# Patient Record
Sex: MALE | Race: BLACK OR AFRICAN AMERICAN | Employment: OTHER | URBAN - METROPOLITAN AREA
[De-identification: names, ages, dates, MRNs, and addresses within clinical notes are randomized per-mention and may not be internally consistent; named-entity substitution may affect disease eponyms.]

---

## 2017-01-17 ENCOUNTER — OFFICE VISIT (OUTPATIENT)
Dept: FAMILY MEDICINE CLINIC | Facility: CLINIC | Age: 47
End: 2017-01-17

## 2017-01-17 VITALS
RESPIRATION RATE: 18 BRPM | HEART RATE: 82 BPM | OXYGEN SATURATION: 97 % | TEMPERATURE: 96.3 F | WEIGHT: 225 LBS | BODY MASS INDEX: 28.88 KG/M2 | DIASTOLIC BLOOD PRESSURE: 92 MMHG | HEIGHT: 74 IN | SYSTOLIC BLOOD PRESSURE: 128 MMHG

## 2017-01-17 DIAGNOSIS — E55.9 VITAMIN D DEFICIENCY: ICD-10-CM

## 2017-01-17 DIAGNOSIS — T50.905A WEIGHT GAIN DUE TO MEDICATION: ICD-10-CM

## 2017-01-17 DIAGNOSIS — R53.82 CHRONIC FATIGUE: ICD-10-CM

## 2017-01-17 DIAGNOSIS — F32.89 OTHER DEPRESSION: ICD-10-CM

## 2017-01-17 DIAGNOSIS — R63.5 WEIGHT GAIN DUE TO MEDICATION: ICD-10-CM

## 2017-01-17 DIAGNOSIS — R80.9 PROTEINURIA: ICD-10-CM

## 2017-01-17 DIAGNOSIS — F98.8 ADD (ATTENTION DEFICIT DISORDER): Primary | ICD-10-CM

## 2017-01-17 DIAGNOSIS — E78.00 PURE HYPERCHOLESTEROLEMIA: ICD-10-CM

## 2017-01-17 RX ORDER — VENLAFAXINE HYDROCHLORIDE 37.5 MG/1
37.5 CAPSULE, EXTENDED RELEASE ORAL DAILY
Qty: 30 CAP | Refills: 1 | Status: SHIPPED | OUTPATIENT
Start: 2017-01-17 | End: 2017-03-16 | Stop reason: SDUPTHER

## 2017-01-17 NOTE — PROGRESS NOTES
History and Physical    Patient: Eva Snow MRN: [id number]  SSN: [id number]    YOB: 1970  Age: 40 y.o. Sex: male      Subjective:      Eva Snow is a 40 y.o. male who is presenting for adult ADD and depression. In terms of his add, he works both at home and at the office. He reports being easily distracted as he will start one project and jump to another without completing it. He reports a difficulty focusing on tasks at hand. He reports his mind will also wander when he is trying to focus on a task. He also reports being easily forgetful. He also states he gets bored easily. He states that sometimes it will seem that he is not listening when he is being spoken to. He admits he processes information more slowly. He states these symptoms occur both at home and at work. Symptoms have been present for more than 6 months. Patient has never been on anything for this before. Patient reports feeling fidgety at home and at work. He states that these symptoms are moderately to severely debiliating as it is becoming very difficult to get work done both at home and at work. In terms of his depression, he states has not heard anything regarding the referral. He continues to suffer from depression but denies si/hi. Patient has a h/o interstitial lung disease for which he takes Prednisone daily and albuterol PRN. Sees pulmonology. He denies cough, sob etc currently. He has a follow up on 2/24/17.     PMH:  Past Medical History   Diagnosis Date    Eczema     Eosinophilia     ILD (interstitial lung disease) ([address]     Interstitial lung disease ([address]     Pulmonary lesion     Sarcoidosis ([address]      Past Surgical History   Procedure Laterality Date    Hx heent       wisdom teeth        FamHx:  Family History   Problem Relation Age of Onset   [address] [facility] Arthritis-osteo Mother     Asthma Mother     No Known Problems Father    [address] [facility] Migraines Sister     No Known Problems Sister Socialhx:  Social History   Substance Use Topics    Smoking status: Current Every Day Smoker     Packs/day: 0.25     Years: 16.00     Types: Cigarettes     Start date: 3/6/2000    Smokeless tobacco: Never Used    Alcohol use 0.0 oz/week     0 Standard drinks or equivalent per week      Comment: once a month        Meds:  Prior to Admission medications    Medication Sig Start Date End Date Taking? Authorizing Provider   venlafaxine-SR [facility] 37.5 mg capsule Take 1 Cap by mouth daily. 1/17/17  Yes Jessica Pumphrey V, PA   triamcinolone acetonide (KENALOG) 0.1 % ointment Apply  to affected area two (2) times a day. use thin layer 1/3/17  Yes Jessica Pumphrey V, PA   albuterol (PROVENTIL HFA, VENTOLIN HFA, PROAIR HFA) 90 mcg/actuation inhaler Take 2 Puffs by inhalation every four (4) hours as needed for Wheezing. 12/20/16  Yes Jessica Pumphrey V, PA   predniSONE (DELTASONE) 20 mg tablet TAKE 1 TAB BY MOUTH DAILY. 8/25/16  Yes Historical Provider   dextroamphetamine-amphetamine (ADDERALL) 10 mg tablet Take 1 Tab by mouth two (2) times a day.  Max Daily Amount: 20 mg. 12/20/16   LIBERTY Webster        Allergies:  No Known Allergies    Review of Systems:  Items in bold are positive:  Constitutional: negative for fevers, chills and malaise  Eyes: negative for visual disturbance  Ears, Nose, Mouth, Throat, and Face: negative for nasal congestion  Respiratory: negative for cough or SOB  Cardiovascular: negative for chest pain, chest pressure/discomfort  Gastrointestinal: negative for nausea, vomiting, melena, BRBPR, diarrhea, constipation and abdominal pain  Genitourinary:negative for frequency, dysuria, hesitancy and decreased stream  Musculoskeletal:negative for myalgias and arthralgias  Neurological: negative for headaches, dizziness and paresthesia  Psych: easily distracted, difficulty concentrating,forgetful, Depression-uncontrolled; denies si/hi    Objective:     Vitals:    01/17/17 1125 01/17/17 1328   BP: (!) 122/97 (!) 128/92   Pulse: 82    Resp: 18    Temp: 96.3 °F (35.7 °C)    TempSrc: Oral    SpO2: 97%    Weight: 225 lb (102.1 kg)    Height: 6' 2\" (1.88 m)         Physical Exam:  GENERAL: alert, cooperative, no distress, appears stated age  [de-identified]: EYE: conjunctivae/corneas clear. PERRL, EOM's intact. LUNG: clear to auscultation bilaterally  HEART: regular rate and rhythm, S1, S2 normal, no murmur, click, rub or gallop  ABDOMEN: negative CVA ttp bilaterally  EXTREMITIES:  extremities normal, atraumatic, no cyanosis or edema  NEUROLOGIC: AOx3. Gait normal.  PSYCH: mood; neutral; affect: netural          Assessment and Plan:       ICD-10-CM ICD-9-CM    1. ADD (attention deficit disorder) F98.8 314.00    2. Other depression F32.89  venlafaxine-SR (EFFEXOR-XR) 37.5 mg capsule   3. Weight gain due to medication T50.901A 783.9 HEMOGLOBIN A1C WITH EAG    R63.5     4. Pure hypercholesterolemia T61.83 054.9 METABOLIC PANEL, COMPREHENSIVE      LIPID PANEL   5. Vitamin D deficiency E55.9 268.9 VITAMIN D, 25 HYDROXY   6. Chronic fatigue R53.82 780.79 CBC WITH AUTOMATED DIFF   7. Proteinuria R80.9 791.0 CANCELED: AMB POC URINALYSIS DIP STICK AUTO W/ MICRO         Medical Decision Making:  ADD- will try to go through another prior auth with updated information    Depression- -start Effexor-advised of potential side effects- referral to to psych still pending    *fasting labs in 3 months    Follow-up Disposition:  Return in about 6 weeks (around 2/28/2017) for routine care with me, for med check. Patient acknowledges understanding of instructions and acknowledges understanding to call back if current symptoms worsen or new symptoms arise. Patient acknowledges and agrees with plan.     Signed By: LIBERTY Hui     January 17, 2017

## 2017-01-17 NOTE — PATIENT INSTRUCTIONS
Venlafaxine (By mouth)   Venlafaxine (lko-uj-JGU-een)  Treats depression, generalized anxiety disorder, panic disorder, and social anxiety disorder. Brand Name(s):Effexor, Effexor XR   There may be other brand names for this medicine. When This Medicine Should Not Be Used: This medicine is not right for everyone. Do not use it if you had an allergic reaction to venlafaxine. How to Use This Medicine:   Long Acting Capsule, Tablet, Long Acting Tablet  · Take your medicine as directed. Your dose may need to be changed several times to find what works best for you. · It is best to take the extended-release form at the same time each day (either in the morning or evening). · It is best to take this medicine with food or milk. · Swallow the extended-release capsule whole. Do not crush, break, or chew it. Do not place the capsule in a liquid. · If you cannot swallow the extended-release capsule, you may open it and pour the medicine into a small amount of soft food such as pudding, yogurt, or applesauce. Stir this mixture well and swallow it without chewing. · This medicine should come with a Medication Guide. Ask your pharmacist for a copy if you do not have one. · Missed dose: Take a dose as soon as you remember. If it is almost time for your next dose, wait until then and take a regular dose. Do not take extra medicine to make up for a missed dose. · Store the medicine in a closed container at room temperature, away from heat, moisture, and direct light. Drugs and Foods to Avoid:   Ask your doctor or pharmacist before using any other medicine, including over-the-counter medicines, vitamins, and herbal products. · Do not use this medicine if you have used an MAO inhibitor (MAOI), such as methylene blue or linezolid, within the past 14 days. Do not take an MAO inhibitor for at least 7 days after you stop this medicine. · Some medicines can affect how venlafaxine works.  Tell your doctor if you are using the following:   ¨ Buspirone, cimetidine, fentanyl, ketoconazole, lithium, metoprolol, Yevgeniy's wort, tramadol, tryptophan supplements, or warfarin  ¨ A diuretic (water pill), a tricyclic antidepressant, a triptan medicine for migraine headaches, medicine to lose weight (such as phentermine), or an NSAID pain or arthritis medicine (such as aspirin, celecoxib, diclofenac, ibuprofen, naproxen)  · Tell your doctor if you use anything else that makes you sleepy. Some examples are allergy medicine, narcotic pain medicine, and alcohol. Warnings While Using This Medicine:   · Tell your doctor if you are pregnant or breastfeeding, or if you have kidney disease, liver disease, glaucoma, heart disease, high blood pressure, or thyroid problems. Tell your doctor if you have a history of seizures or heart attack. · For some children, teenagers, and young adults, this medicine may increase mental or emotional problems. This may lead to thoughts of suicide and violence. Talk with your doctor right away if you have any thoughts or behavior changes that concern you. Tell your doctor if you or anyone in your family has a history of bipolar disorder or suicide attempts. · This medicine may cause the following problems:   ¨ Serotonin syndrome (when taken with certain medicines)  ¨ Low sodium levels (more common in elderly patients and those who take diuretics or become dehydrated)  ¨ Increased cholesterol levels  ¨ Increased blood pressure  ¨ Bleeding problems (more likely when taken with aspirin, other NSAIDs, or warfarin)  ¨ Interstitial lung disease and eosinophilic pneumonia  · This medicine may make you dizzy or drowsy. Do not drive or do anything that could be dangerous until you know how this medicine affects you. · Do not stop using this medicine suddenly. Your doctor will need to slowly decrease your dose before you stop it completely.   · Your doctor will do lab tests at regular visits to check on the effects of this medicine. Keep all appointments. · Keep all medicine out of the reach of children. Never share your medicine with anyone. Possible Side Effects While Using This Medicine:   Call your doctor right away if you notice any of these side effects:  · Allergic reaction: Itching or hives, swelling in your face or hands, swelling or tingling in your mouth or throat, chest tightness, trouble breathing  · Anxiety, restlessness, fever, sweating, muscle spasms, nausea, vomiting, diarrhea, seeing or hearing things that are not there  · Blistering, peeling, red skin rash  · Chest pain, cough, trouble breathing  · Confusion, weakness, and muscle twitching  · Eye pain, vision changes, seeing halos around lights  · Fast or pounding heartbeat  · Feeling more excited or energetic than usual  · Headache, trouble concentrating, memory problems, unsteadiness  · Seizures  · Trouble sleeping, nervousness, unusual dreams  · Unusual behavior, thoughts of hurting yourself or others  · Unusual bleeding or bruising  If you notice these less serious side effects, talk with your doctor:   · Dry mouth  · Headache  · Mild nausea, constipation, vomiting, loss of appetite, weight loss  · Sexual problems  · Sleepiness or unusual drowsiness  If you notice other side effects that you think are caused by this medicine, tell your doctor. Call your doctor for medical advice about side effects. You may report side effects to FDA at 4-173-FDA-0835  © 2016 [address] is for End User's use only and may not be sold, redistributed or otherwise used for commercial purposes. The above information is an  only. It is not intended as medical advice for individual conditions or treatments. Talk to your doctor, nurse or pharmacist before following any medical regimen to see if it is safe and effective for you.

## 2017-01-17 NOTE — MR AVS SNAPSHOT
Visit Information Date & Time Provider Department Dept. Phone Encounter #  
 1/17/2017 11:15 AM Mia Puri 47 [phone] [id number] Follow-up Instructions Return in about 6 weeks (around 2/28/2017) for routine care with me, for med check. Your Appointments 2/23/2017  8:00 AM  
Nurse Visit with PFT [facility] Edson [facility] [employer] [facility]) Appt Note: 3 m f/u sarcoidosis with Full PFT and 6 min walk [id number] [facility] [address]  
  
    
 2/23/2017  9:00 AM  
Follow Up with MD Luis Armando W[facility]) Appt Note: 3 m f/u sarcoidosis with Full PFT and 6 min walk [id number] [facility] [address] Upcoming Health Maintenance Date Due DTaP/Tdap/Td series (1 - Tdap) 11/10/1992 Allergies as of 1/17/2017  Review Complete On: 1/17/2017 By: Dhaval Honeycutt No Known Allergies Current Immunizations  Reviewed on 9/20/2016 No immunizations on file. Not reviewed this visit You Were Diagnosed With   
  
 Codes Comments Proteinuria    -  Primary ICD-10-CM: R80.9 ICD-9-CM: 791.0 Weight gain due to medication     ICD-10-CM: T50.901A, R63.5 ICD-9-CM: 783.9 Pure hypercholesterolemia     ICD-10-CM: E78.00 ICD-9-CM: 272.0 Vitamin D deficiency     ICD-10-CM: E55.9 ICD-9-CM: 268.9 Chronic fatigue     ICD-10-CM: R53.82 
ICD-9-CM: 780.79 Other depression     ICD-10-CM: F32.89 Vitals BP Pulse Temp Resp Height(growth percentile) Weight(growth percentile) (!) 122/97 (BP 1 Location: Right arm, BP Patient Position: Sitting) 82 96.3 °F (35.7 °C) (Oral) 18 6' 2\" (1.88 m) 225 lb (102.1 kg) SpO2 BMI Smoking Status 97% 28.89 kg/m2 Current Every Day Smoker BMI and BSA Data Body Mass Index Body Surface Area  
 28.89 kg/m 2 2.31 m 2 Preferred Pharmacy Pharmacy Name Phone [id number] [place] [phone] Your Updated Medication List  
  
   
This list is accurate as of: 1/17/17 11:45 AM.  Always use your most recent med list.  
  
  
  
  
 albuterol 90 mcg/actuation inhaler Commonly known as:  PROVENTIL HFA, VENTOLIN HFA, PROAIR HFA Take 2 Puffs by inhalation every four (4) hours as needed for Wheezing. dextroamphetamine-amphetamine 10 mg tablet Commonly known as:  ADDERALL Take 1 Tab by mouth two (2) times a day. Max Daily Amount: 20 mg.  
  
 predniSONE 20 mg tablet Commonly known as:  DELTASONE  
TAKE 1 TAB BY MOUTH DAILY. triamcinolone acetonide 0.1 % ointment Commonly known as:  KENALOG Apply  to affected area two (2) times a day. use thin layer  
  
 venlafaxine-SR 37.5 mg capsule Commonly known as:  EFFEXOR-XR Take 1 Cap by mouth daily. Prescriptions Sent to Pharmacy Refills  
 venlafaxine-SR (EFFEXOR-XR) 37.5 mg capsule 1 Sig: Take 1 Cap by mouth daily. Class: Normal  
 Pharmacy: [address], [address]  #: [phone] Route: Oral  
  
We Performed the Following AMB POC URINALYSIS DIP STICK AUTO W/ MICRO [99956 CPT(R)] Follow-up Instructions Return in about 6 weeks (around 2/28/2017) for routine care with me, for med check. To-Do List   
 04/17/2017 Lab:  VITAMIN D, 25 HYDROXY   
  
 04/18/2017 Lab:  HEMOGLOBIN A1C WITH EAG   
  
 04/19/2017 Lab:  CBC WITH AUTOMATED DIFF   
  
 04/19/2017 Lab:  LIPID PANEL   
  
 04/19/2017 Lab:  METABOLIC PANEL, COMPREHENSIVE Patient Instructions Venlafaxine (By mouth) Venlafaxine (vjh-xr-WNL-een) Treats depression, generalized anxiety disorder, panic disorder, and social anxiety disorder. Brand Name(s):Effexor, Effexor XR There may be other brand names for this medicine. When This Medicine Should Not Be Used: This medicine is not right for everyone. Do not use it if you had an allergic reaction to venlafaxine. How to Use This Medicine:  
Long Acting Capsule, Tablet, Long Acting Tablet · Take your medicine as directed. Your dose may need to be changed several times to find what works best for you. · It is best to take the extended-release form at the same time each day (either in the morning or evening). · It is best to take this medicine with food or milk. · Swallow the extended-release capsule whole. Do not crush, break, or chew it. Do not place the capsule in a liquid. · If you cannot swallow the extended-release capsule, you may open it and pour the medicine into a small amount of soft food such as pudding, yogurt, or applesauce. Stir this mixture well and swallow it without chewing. · This medicine should come with a Medication Guide. Ask your pharmacist for a copy if you do not have one. · Missed dose: Take a dose as soon as you remember. If it is almost time for your next dose, wait until then and take a regular dose. Do not take extra medicine to make up for a missed dose. · Store the medicine in a closed container at room temperature, away from heat, moisture, and direct light. Drugs and Foods to Avoid: Ask your doctor or pharmacist before using any other medicine, including over-the-counter medicines, vitamins, and herbal products. · Do not use this medicine if you have used an MAO inhibitor (MAOI), such as methylene blue or linezolid, within the past 14 days. Do not take an MAO inhibitor for at least 7 days after you stop this medicine. · Some medicines can affect how venlafaxine works. Tell your doctor if you are using the following: ¨ Buspirone, cimetidine, fentanyl, ketoconazole, lithium, metoprolol, Yevgeniy's wort, tramadol, tryptophan supplements, or warfarin ¨ A diuretic (water pill), a tricyclic antidepressant, a triptan medicine for migraine headaches, medicine to lose weight (such as phentermine), or an NSAID pain or arthritis medicine (such as aspirin, celecoxib, diclofenac, ibuprofen, naproxen) · Tell your doctor if you use anything else that makes you sleepy. Some examples are allergy medicine, narcotic pain medicine, and alcohol. Warnings While Using This Medicine: · Tell your doctor if you are pregnant or breastfeeding, or if you have kidney disease, liver disease, glaucoma, heart disease, high blood pressure, or thyroid problems. Tell your doctor if you have a history of seizures or heart attack. · For some children, teenagers, and young adults, this medicine may increase mental or emotional problems. This may lead to thoughts of suicide and violence. Talk with your doctor right away if you have any thoughts or behavior changes that concern you. Tell your doctor if you or anyone in your family has a history of bipolar disorder or suicide attempts. · This medicine may cause the following problems:  
¨ Serotonin syndrome (when taken with certain medicines) ¨ Low sodium levels (more common in elderly patients and those who take diuretics or become dehydrated) ¨ Increased cholesterol levels ¨ Increased blood pressure ¨ Bleeding problems (more likely when taken with aspirin, other NSAIDs, or warfarin) ¨ Interstitial lung disease and eosinophilic pneumonia · This medicine may make you dizzy or drowsy. Do not drive or do anything that could be dangerous until you know how this medicine affects you. · Do not stop using this medicine suddenly. Your doctor will need to slowly decrease your dose before you stop it completely.  
· Your doctor will do lab tests at regular visits to check on the effects of this medicine. Keep all appointments. · Keep all medicine out of the reach of children. Never share your medicine with anyone. Possible Side Effects While Using This Medicine:  
Call your doctor right away if you notice any of these side effects: · Allergic reaction: Itching or hives, swelling in your face or hands, swelling or tingling in your mouth or throat, chest tightness, trouble breathing · Anxiety, restlessness, fever, sweating, muscle spasms, nausea, vomiting, diarrhea, seeing or hearing things that are not there · Blistering, peeling, red skin rash · Chest pain, cough, trouble breathing · Confusion, weakness, and muscle twitching · Eye pain, vision changes, seeing halos around lights · Fast or pounding heartbeat · Feeling more excited or energetic than usual 
· Headache, trouble concentrating, memory problems, unsteadiness · Seizures · Trouble sleeping, nervousness, unusual dreams · Unusual behavior, thoughts of hurting yourself or others · Unusual bleeding or bruising If you notice these less serious side effects, talk with your doctor: · Dry mouth 
· Headache · Mild nausea, constipation, vomiting, loss of appetite, weight loss · Sexual problems · Sleepiness or unusual drowsiness If you notice other side effects that you think are caused by this medicine, tell your doctor. Call your doctor for medical advice about side effects. You may report side effects to FDA at 4-954-FDA-4387 © 2016 [address] is for End User's use only and may not be sold, redistributed or otherwise used for commercial purposes. The above information is an  only. It is not intended as medical advice for individual conditions or treatments. Talk to your doctor, nurse or pharmacist before following any medical regimen to see if it is safe and effective for you. Introducing \[facility] [place] [facility]\"" & HEALTH SERVICES!    
 Jalyn Tom introduces [employer] patient portal. Now you can access parts of your medical record, email your doctor's office, and request medication refills online. 1. In your internet browser, go to [email or web address] 2. Click on the First Time User? Click Here link in the Sign In box. You will see the New Member Sign Up page. 3. Enter your [employer] Access Code exactly as it appears below. You will not need to use this code after youve completed the sign-up process. If you do not sign up before the expiration date, you must request a new code. · [employer] Access Code: [email or web address] Expires: 3/20/2017  5:02 PM 
 
4. Enter the last four digits of your Social Security Number (xxxx) and Date of Birth (mm/dd/yyyy) as indicated and click Submit. You will be taken to the next sign-up page. 5. Create a [employer] ID. This will be your [employer] login ID and cannot be changed, so think of one that is secure and easy to remember. 6. Create a [employer] password. You can change your password at any time. 7. Enter your Password Reset Question and Answer. This can be used at a later time if you forget your password. 8. Enter your e-mail address. You will receive e-mail notification when new information is available in [address] 9. Click Sign Up. You can now view and download portions of your medical record. 10. Click the Download Summary menu link to download a portable copy of your medical information. If you have questions, please visit the Frequently Asked Questions section of the [employer] website. Remember, [employer] is NOT to be used for urgent needs. For medical emergencies, dial 911. Now available from your iPhone and Android! Please provide this summary of care documentation to your next provider. Your primary care clinician is listed as Zoila Malave. If you have any questions after today's visit, please call [phone].

## 2017-01-17 NOTE — PROGRESS NOTES
Nickolas Chavis is a 40 y.o. male presents today for follow up. 1. Have you been to the ER, urgent care clinic since your last visit? Hospitalized since your last visit? No    2. Have you seen or consulted any other health care providers outside of the [address] [place] [address] since your last visit? Include any pap smears or colon screening.  No

## 2017-01-26 LAB
BILIRUB UR QL: NEGATIVE
CREATININE, URINE: 117 MG/DL
EPITHELIAL,EPSU: NORMAL /HPF (ref 0–2)
GLUCOSE UR QL: NEGATIVE MG/DL
HGB UR QL STRIP: NEGATIVE
KETONES UR QL STRIP.AUTO: NEGATIVE MG/DL
LEUKOCYTE ESTERASE: NEGATIVE
MICROALB/CREAT RATIO, 140286: NORMAL MCG/MG OF CREATININE (ref 0–30)
MICROALBUMIN,URINE RANDOM 140054: <12 UG/ML (ref 0.1–17)
NITRITE UR QL STRIP.AUTO: NEGATIVE
PH UR STRIP: 8 PH (ref 5–8)
PROT UR QL STRIP: NEGATIVE MG/DL
RBC #/AREA URNS HPF: NORMAL /HPF
SP GR UR: 1.02 (ref 1–1.03)
UROBILINOGEN UR STRIP-MCNC: <2 MG/DL
WBC URNS QL MICRO: NORMAL /HPF (ref 0–2)

## 2017-03-16 DIAGNOSIS — F32.89 OTHER DEPRESSION: ICD-10-CM

## 2017-03-16 RX ORDER — VENLAFAXINE HYDROCHLORIDE 37.5 MG/1
37.5 CAPSULE, EXTENDED RELEASE ORAL DAILY
Qty: 30 CAP | Refills: 3 | Status: SHIPPED | OUTPATIENT
Start: 2017-03-16 | End: 2017-04-11 | Stop reason: ALTCHOICE

## 2017-03-27 NOTE — TELEPHONE ENCOUNTER
I contacted the pt. And informed him that Dr. Adeline Stanford is no longer with [facility] in [place]. He was not aware of this. I explained that he has the option of coming over to [facility] until they can get a Pulmonologists in [place], or, he could check with his PCP re: a referral to a Pulmonologist in [place]. He states he need an appt. With his PCP and will ask them. I also pointed out if, he could, he needs a refill on his Prednisone and to check and see if, they would be willing to do it for him. We will help in any way that we can.

## 2017-04-11 ENCOUNTER — OFFICE VISIT (OUTPATIENT)
Dept: FAMILY MEDICINE CLINIC | Facility: CLINIC | Age: 47
End: 2017-04-11

## 2017-04-11 VITALS
DIASTOLIC BLOOD PRESSURE: 90 MMHG | HEIGHT: 74 IN | SYSTOLIC BLOOD PRESSURE: 124 MMHG | OXYGEN SATURATION: 96 % | TEMPERATURE: 97.8 F | BODY MASS INDEX: 28.36 KG/M2 | WEIGHT: 221 LBS | RESPIRATION RATE: 16 BRPM | HEART RATE: 93 BPM

## 2017-04-11 DIAGNOSIS — D72.10 EOSINOPHILIA: ICD-10-CM

## 2017-04-11 DIAGNOSIS — F90.9 ADULT ADHD: ICD-10-CM

## 2017-04-11 DIAGNOSIS — D86.0 PULMONARY SARCOIDOSIS (HCC): ICD-10-CM

## 2017-04-11 DIAGNOSIS — J84.9 ILD (INTERSTITIAL LUNG DISEASE) (HCC): ICD-10-CM

## 2017-04-11 DIAGNOSIS — F32.4 MAJOR DEPRESSIVE DISORDER WITH SINGLE EPISODE, IN PARTIAL REMISSION (HCC): Primary | ICD-10-CM

## 2017-04-11 PROBLEM — F32.9 MAJOR DEPRESSIVE DISORDER: Status: ACTIVE | Noted: 2017-04-11

## 2017-04-11 PROBLEM — F98.8 ADD (ATTENTION DEFICIT DISORDER): Status: ACTIVE | Noted: 2017-04-11

## 2017-04-11 RX ORDER — DEXTROAMPHETAMINE SACCHARATE, AMPHETAMINE ASPARTATE, DEXTROAMPHETAMINE SULFATE AND AMPHETAMINE SULFATE 2.5; 2.5; 2.5; 2.5 MG/1; MG/1; MG/1; MG/1
10 TABLET ORAL 2 TIMES DAILY
Qty: 60 TAB | Refills: 0 | Status: SHIPPED | OUTPATIENT
Start: 2017-04-11 | End: 2017-07-11 | Stop reason: SDUPTHER

## 2017-04-11 RX ORDER — ALBUTEROL SULFATE 90 UG/1
2 AEROSOL, METERED RESPIRATORY (INHALATION)
Qty: 1 INHALER | Refills: 6 | Status: SHIPPED | OUTPATIENT
Start: 2017-04-11 | End: 2017-07-11 | Stop reason: SDUPTHER

## 2017-04-11 RX ORDER — BUPROPION HYDROCHLORIDE 150 MG/1
150 TABLET ORAL
Qty: 30 TAB | Refills: 3 | Status: SHIPPED | OUTPATIENT
Start: 2017-04-11 | End: 2017-11-07

## 2017-04-11 NOTE — PATIENT INSTRUCTIONS
Depression and Chronic Disease: Care Instructions  Your Care Instructions  A chronic disease is one that you have for a long time. Some chronic diseases can be controlled, but they usually cannot be cured. Depression is common in people with chronic diseases, but it often goes unnoticed. Many people have concerns about seeking treatment for a mental health problem. You may think it's a sign of weakness, or you don't want people to know about it. It's important to overcome these reasons for not seeking treatment. Treating depression or anxiety is good for your health. Follow-up care is a key part of your treatment and safety. Be sure to make and go to all appointments, and call your doctor if you are having problems. It's also a good idea to know your test results and keep a list of the medicines you take. How can you care for yourself at home? Watch for symptoms of depression  The symptoms of depression are often subtle at first. You may think they are caused by your disease rather than depression. Or you may think it is normal to be depressed when you have a chronic disease. If you are depressed you may:  · Feel sad or hopeless. · Feel guilty or worthless. · Not enjoy the things you used to enjoy. · Feel hopeless, as though life is not worth living. · Have trouble thinking or remembering. · Have low energy, and you may not eat or sleep well. · Pull away from others. · Think often about death or killing yourself. (Keep the numbers for these national suicide hotlines: 0-754-919-TALK [1-698.368.9340] and 7-035-IDOVTOZ [1-930.797.3175]. )  Get treatment  By treating your depression, you can feel more hopeful and have more energy. If you feel better, you may take better care of yourself, so your health may improve. · Talk to your doctor if you have any changes in mood during treatment for your disease. · Ask your doctor for help.  Counseling, antidepressant medicine, or a combination of the two can help most people with depression. Often a combination works best. Counseling can also help you cope with having a chronic disease. When should you call for help? Call 911 anytime you think you may need emergency care. For example, call if:  · You feel like hurting yourself or someone else. · Someone you know has depression and is about to attempt or is attempting suicide. Call your doctor now or seek immediate medical care if:  · You hear voices. · Someone you know has depression and:  ¨ Starts to give away his or her possessions. ¨ Uses illegal drugs or drinks alcohol heavily. ¨ Talks or writes about death, including writing suicide notes or talking about guns, knives, or pills. ¨ Starts to spend a lot of time alone. ¨ Acts very aggressively or suddenly appears calm. Watch closely for changes in your health, and be sure to contact your doctor if:  · You do not get better as expected. Where can you learn more? Go to http://ramon-josue.info/. Enter O979 in the search box to learn more about \"Depression and Chronic Disease: Care Instructions. \"  Current as of: July 26, 2016  Content Version: 11.2  © 6772-8959 [employer]. Care instructions adapted under license by [employer] (which disclaims liability or warranty for this information). If you have questions about a medical condition or this instruction, always ask your healthcare professional. [address] any warranty or liability for your use of this information.

## 2017-04-11 NOTE — MR AVS SNAPSHOT
Visit Information Date & Time Provider Department Dept. Phone Encounter #  
 4/11/2017  1:00 PM Mia Guillaume [phone] [id number] Upcoming Health Maintenance Date Due DTaP/Tdap/Td series (1 - Tdap) 11/10/1992 Allergies as of 4/11/2017  Review Complete On: 4/11/2017 By: Nell Hernandez No Known Allergies Current Immunizations  Reviewed on 9/20/2016 No immunizations on file. Not reviewed this visit You Were Diagnosed With   
  
 Codes Comments Major depressive disorder with single episode, in partial remission ([facility]    -  Primary ICD-10-CM: F32.4 ICD-9-CM: 296.25   
 ADD (attention deficit disorder)     ICD-10-CM: F98.8 ICD-9-CM: 314.00 ILD (interstitial lung disease) ([facility]     ICD-10-CM: J84.9 ICD-9-CM: 981 Pulmonary sarcoidosis ([facility]     ICD-10-CM: D86.0 ICD-9-CM: 135, 517.8 LARA (dyspnea on exertion)     ICD-10-CM: R06.09 
ICD-9-CM: 786.09 Eosinophilia     ICD-10-CM: D72.1 ICD-9-CM: 729. 3 Smoker     ICD-10-CM: T03.411 ICD-9-CM: 305.1 Adult ADHD     ICD-10-CM: F90.9 ICD-9-CM: 314.01 Vitals BP Pulse Temp Resp Height(growth percentile) Weight(growth percentile) (!) 137/92 (BP 1 Location: Right arm, BP Patient Position: Sitting) 93 97.8 °F (36.6 °C) (Oral) 16 6' 2\" (1.88 m) 221 lb (100.2 kg) SpO2 BMI Smoking Status 96% 28.37 kg/m2 Current Every Day Smoker Vitals History BMI and BSA Data Body Mass Index Body Surface Area  
 28.37 kg/m 2 2.29 m 2 Preferred Pharmacy Pharmacy Name Phone [facility] [address] [phone] Your Updated Medication List  
  
   
This list is accurate as of: 4/11/17  1:37 PM.  Always use your most recent med list.  
  
  
  
  
 albuterol 90 mcg/actuation inhaler Commonly known as:  PROVENTIL HFA, VENTOLIN HFA, PROAIR HFA  
 Take 2 Puffs by inhalation every four (4) hours as needed for Wheezing. buPROPion  mg tablet Commonly known as:  Almjered Kitty Take 1 Tab by mouth every morning. dextroamphetamine-amphetamine 10 mg tablet Commonly known as:  ADDERALL Take 1 Tab (10 mg total) by mouth two (2) times a dayEarliest Fill Date: 4/11/17. Max Daily Amount: 20 mg  
  
 predniSONE 20 mg tablet Commonly known as:  DELTASONE  
TAKE 1 TAB BY MOUTH DAILY. triamcinolone acetonide 0.1 % ointment Commonly known as:  KENALOG Apply  to affected area two (2) times a day. use thin layer Prescriptions Printed Refills  
 dextroamphetamine-amphetamine (ADDERALL) 10 mg tablet 0 Sig: Take 1 Tab (10 mg total) by mouth two (2) times a dayEarliest Fill Date: 4/11/17. Max Daily Amount: 20 mg  
 Class: Print Route: Oral  
  
Prescriptions Sent to Pharmacy Refills  
 albuterol (PROVENTIL HFA, VENTOLIN HFA, PROAIR HFA) 90 mcg/actuation inhaler 6 Sig: Take 2 Puffs by inhalation every four (4) hours as needed for Wheezing. Class: Normal  
 Pharmacy: [id number] [address] [place] [address] Ph #: [phone] Route: Inhalation buPROPion XL (WELLBUTRIN XL) 150 mg tablet 3 Sig: Take 1 Tab by mouth every morning. Class: Normal  
 Pharmacy: [id number] [address] [place] [address] Ph #: [phone] Route: Oral  
  
We Performed the Following REFERRAL TO PULMONARY DISEASE [AYM14 Custom] Comments:  
 Please evaluate patient for h/o sarcodosis Referral Information Referral ID Referred By Referred To  
  
 [id number] Lakshmi Peace MD   
   [address] [facility] Phone: [phone] Fax: [phone] Visits Status Start Date End Date 1 New Request 4/11/17 4/11/18  If your referral has a status of pending review or denied, additional information will be sent to support the outcome of this decision. Patient Instructions Depression and Chronic Disease: Care Instructions Your Care Instructions A chronic disease is one that you have for a long time. Some chronic diseases can be controlled, but they usually cannot be cured. Depression is common in people with chronic diseases, but it often goes unnoticed. Many people have concerns about seeking treatment for a mental health problem. You may think it's a sign of weakness, or you don't want people to know about it. It's important to overcome these reasons for not seeking treatment. Treating depression or anxiety is good for your health. Follow-up care is a key part of your treatment and safety. Be sure to make and go to all appointments, and call your doctor if you are having problems. It's also a good idea to know your test results and keep a list of the medicines you take. How can you care for yourself at home? Watch for symptoms of depression The symptoms of depression are often subtle at first. You may think they are caused by your disease rather than depression. Or you may think it is normal to be depressed when you have a chronic disease. If you are depressed you may: · Feel sad or hopeless. · Feel guilty or worthless. · Not enjoy the things you used to enjoy. · Feel hopeless, as though life is not worth living. · Have trouble thinking or remembering. · Have low energy, and you may not eat or sleep well. · Pull away from others. · Think often about death or killing yourself. (Keep the numbers for these national suicide hotlines: 3-273-836-TALK [1-887.202.7558] and 0-281-ZBXRAJL [1-986.536.4173]. ) Get treatment By treating your depression, you can feel more hopeful and have more energy. If you feel better, you may take better care of yourself, so your health may improve. · Talk to your doctor if you have any changes in mood during treatment for your disease. · Ask your doctor for help. Counseling, antidepressant medicine, or a combination of the two can help most people with depression. Often a combination works best. Counseling can also help you cope with having a chronic disease. When should you call for help? Call 911 anytime you think you may need emergency care. For example, call if: 
· You feel like hurting yourself or someone else. · Someone you know has depression and is about to attempt or is attempting suicide. Call your doctor now or seek immediate medical care if: 
· You hear voices. · Someone you know has depression and: 
¨ Starts to give away his or her possessions. ¨ Uses illegal drugs or drinks alcohol heavily. ¨ Talks or writes about death, including writing suicide notes or talking about guns, knives, or pills. ¨ Starts to spend a lot of time alone. ¨ Acts very aggressively or suddenly appears calm. Watch closely for changes in your health, and be sure to contact your doctor if: 
· You do not get better as expected. Where can you learn more? Go to http://ramon-josue.info/. Enter R664 in the search box to learn more about \"Depression and Chronic Disease: Care Instructions. \" Current as of: July 26, 2016 Content Version: 11.2 © 4045-8601 [employer]. Care instructions adapted under license by [employer] (which disclaims liability or warranty for this information). If you have questions about a medical condition or this instruction, always ask your healthcare professional. [place] [address] any warranty or liability for your use of this information. Introducing [facility]! Willie Du introduces Starline patient portal. Now you can access parts of your medical record, email your doctor's office, and request medication refills online. 1. In your internet browser, go to [email or web address] 2. Click on the First Time User? Click Here link in the Sign In box. You will see the New Member Sign Up page. 3. Enter your true[x] Media Access Code exactly as it appears below. You will not need to use this code after youve completed the sign-up process. If you do not sign up before the expiration date, you must request a new code. · true[x] Media Access Code: [email or web address] Expires: 7/4/2017  3:53 PM 
 
4. Enter the last four digits of your Social Security Number (xxxx) and Date of Birth (mm/dd/yyyy) as indicated and click Submit. You will be taken to the next sign-up page. 5. Create a true[x] Media ID. This will be your true[x] Media login ID and cannot be changed, so think of one that is secure and easy to remember. 6. Create a true[x] Media password. You can change your password at any time. 7. Enter your Password Reset Question and Answer. This can be used at a later time if you forget your password. 8. Enter your e-mail address. You will receive e-mail notification when new information is available in [address] 9. Click Sign Up. You can now view and download portions of your medical record. 10. Click the Download Summary menu link to download a portable copy of your medical information. If you have questions, please visit the Frequently Asked Questions section of the true[x] Media website. Remember, true[x] Media is NOT to be used for urgent needs. For medical emergencies, dial 911. Now available from your iPhone and Android! Please provide this summary of care documentation to your next provider. Your primary care clinician is listed as Roque Darnell. If you have any questions after today's visit, please call [phone].

## 2017-04-11 NOTE — PROGRESS NOTES
History and Physical    Patient: Carmel Dover MRN: [id number]  SSN: [id number]    YOB: 1970  Age: 40 y.o. Sex: male      Subjective:      Carmel Dover is a 40 y.o. male who is presenting today for follow up of ADD, depression, interstitial lung disease/sarcoidosis etc.    In terms of his depression, he was placed on Effexor several months ago and told to follow up within 6 weeks for re-evaluation, this was not done. Patient states he takes the medication but not every day as it causes headaches. States depression is only moderately controlled. He was previously on Wellbutrin and did well on 150 dose but did not tolerate it when it was increased to 300, again due to headaches. He states it helped with his smoking and he felt his mood improved when he was on the 150 dosage. He has been referred to psych, has a new patient appointment next month. Denies si/hi. In terms fo his ADD, he is on Adderall 10 mg BID. He states that he does not take the medication on the weekends and sometimes does not take 2 pills daily as does not feel he needs to sometimes. He has noticed that he is able to focus on a task and is even able to multi-task now. His concentration is much improved. He has noticed a vast improvement in his symptoms.      Patient has a h/o interstitial lung disease and sarcoidosis, for which he takes Prednisone daily and albuterol PRN, requesting refill of albuterol. He was being seen by pulmonology, and was suppose to have recent PFTs, but he states that he was told that he could not be seen by them anymore, he was unsure if it was due to providers leaving or to insurance. He denies cough, sob etc currently. Per chart review, patient was suppose to have a PPD, was reported to be negative. Patient was also suppose to be on Bactrim for PCP prophylaxis, however, patient admits he stopped taking it.       Per chart review, patient has had positive GABRIELE in the past, but more recent recheck was normal.    Patient was referred to hematology for h/o persistent eosinophilia, patient has never been seen by them. PMH:  Past Medical History:   Diagnosis Date    Eczema     Eosinophilia     [facility]     Interstitial lung disease [facility]     Pulmonary lesion     Sarcoidosis (HCC)      Past Surgical History:   Procedure Laterality Date    HX HEENT      wisdom teeth        FamHx:  Family History   Problem Relation Age of Onset    Arthritis-osteo Mother     Asthma Mother     No Known Problems Father     Migraines Sister     No Known Problems Sister        Socialhx:  Social History   Substance Use Topics    Smoking status: Current Every Day Smoker     Packs/day: 0.25     Years: 16.00     Types: Cigarettes     Start date: 3/6/2000    Smokeless tobacco: Never Used    Alcohol use 0.0 oz/week     0 Standard drinks or equivalent per week      Comment: once a month        Meds:  Prior to Admission medications    Medication Sig Start Date End Date Taking? Authorizing Provider   albuterol (PROVENTIL HFA, VENTOLIN HFA, PROAIR HFA) 90 mcg/actuation inhaler Take 2 Puffs by inhalation every four (4) hours as needed for Wheezing. 4/11/17  Yes Jessica Pumphrey V, PA   dextroamphetamine-amphetamine (ADDERALL) 10 mg tablet Take 1 Tab (10 mg total) by mouth two (2) times a dayEarliest Fill Date: 4/11/17. Max Daily Amount: 20 mg 4/11/17  Yes Jessica Pumphrey V, PA   buPROPion XL (WELLBUTRIN XL) 150 mg tablet Take 1 Tab by mouth every morning. 4/11/17  Yes Jessica Pumphrey V, PA   triamcinolone acetonide (KENALOG) 0.1 % ointment Apply  to affected area two (2) times a day. use thin layer 1/3/17  Yes Jessica Pumphrey V, PA   predniSONE (DELTASONE) 20 mg tablet TAKE 1 TAB BY MOUTH DAILY.  8/25/16  Yes Historical Provider        Allergies:  No Known Allergies    Review of Systems:  Items in bold are positive:  Constitutional: negative for fevers, chills and malaise  Eyes: negative for visual disturbance  Ears, Nose, Mouth, Throat, and Face: negative for nasal congestion  Respiratory: negative for cough or SOB  Cardiovascular: negative for chest pain, chest pressure/discomfort  Gastrointestinal: negative for nausea, vomiting, melena, BRBPR, diarrhea, constipation and abdominal pain  Genitourinary:negative for frequency, dysuria, hesitancy and decreased stream  Musculoskeletal:negative for myalgias and arthralgias  Neurological: negative for headaches, dizziness and paresthesia  Psych: easily distracted, difficulty concentrating,forgetful-improved/stable, Depression-fairly controlled; denies si/hi    Objective:     Vitals:    04/11/17 1305 04/11/17 1405   BP: (!) 137/92 124/90   Pulse: 93    Resp: 16    Temp: 97.8 °F (36.6 °C)    TempSrc: Oral    SpO2: 96%    Weight: 221 lb (100.2 kg)    Height: 6' 2\" (1.88 m)         Physical Exam:  GENERAL: alert, cooperative, no distress, appears stated age  HEENT: EYE: conjunctivae/corneas clear. PERRL, EOM's intact. EAR: TM's pearly gray bilaterally NOSE: Nasal mucosa pink and moist bilaterally, THROAT: no erythema or edema  THYROID: no thyromegaly  NECK: no adenopathy  LUNG: clear to auscultation bilaterally  HEART: regular rate and rhythm, S1, S2 normal, no murmur, click, rub or gallop  ABDOMEN: soft, non-tender. Bowel sounds normal. No masses  EXTREMITIES:  extremities normal, atraumatic, no cyanosis or edema  NEUROLOGIC: AOx3. Gait normal.  PSYCH: mood: neutral affect: neutral          Assessment and Plan:       ICD-10-CM ICD-9-CM    1. Major depressive disorder with single episode, in partial remission ([facility]) F32.4 296.25 buPROPion XL (WELLBUTRIN XL) 150 mg tablet   2. Adult ADHD F90.9 314.01 dextroamphetamine-amphetamine (ADDERALL) 10 mg tablet   3. ILD (interstitial lung disease) ([facility]) J84.9 515 albuterol (PROVENTIL HFA, VENTOLIN HFA, PROAIR HFA) 90 mcg/actuation inhaler      REFERRAL TO PULMONARY DISEASE   4.  Pulmonary sarcoidosis ([facility] D86.0 135      517.8    5. Eosinophilia D72.1 288. 3          Medical Decision Making:  Depression- restart Wellbutrin + education given on how to titrate off of Effexor-advised of potential side effects, ed precautions given, patient to follow up with psych as scheduled    ADD-  Refill Adderall  -  reviewed, only Adderall once from this office on report, symptoms well controlled     ILD/Sarcoidosis- referral to pulmonlogy given to patient to call and self schedule- patient to have PPD placed upon RTC next Monday for blood work    Eosinophilia- CBC    *patient to return to have fasting labs drawn    Follow-up Disposition:  Return in about 3 months (around 7/11/2017) for routine care with me. Patient acknowledges understanding of instructions and acknowledges understanding to call back if current symptoms worsen or new symptoms arise. Patient acknowledges and agrees with plan.     Signed By: LIBERTY Cabrera     April 11, 2017

## 2017-04-17 ENCOUNTER — CLINICAL SUPPORT (OUTPATIENT)
Dept: FAMILY MEDICINE CLINIC | Facility: CLINIC | Age: 47
End: 2017-04-17

## 2017-04-17 DIAGNOSIS — E55.9 VITAMIN D DEFICIENCY: ICD-10-CM

## 2017-04-18 ENCOUNTER — TELEPHONE (OUTPATIENT)
Dept: FAMILY MEDICINE CLINIC | Facility: CLINIC | Age: 47
End: 2017-04-18

## 2017-04-18 DIAGNOSIS — R63.5 WEIGHT GAIN DUE TO MEDICATION: ICD-10-CM

## 2017-04-18 DIAGNOSIS — T50.905A WEIGHT GAIN DUE TO MEDICATION: ICD-10-CM

## 2017-04-18 LAB
25(OH)D3 SERPL-MCNC: 12.9 NG/ML (ref 32–100)
A-G RATIO,AGRAT: 1.4 RATIO (ref 1.1–2.6)
ABSOLUTE LYMPHOCYTE COUNT, 10803: 1.7 K/UL (ref 1–4.8)
ALBUMIN SERPL-MCNC: 4.1 G/DL (ref 3.5–5)
ALP SERPL-CCNC: 71 U/L (ref 25–115)
ALT SERPL-CCNC: 16 U/L (ref 5–40)
ANION GAP SERPL CALC-SCNC: 16 MMOL/L
AST SERPL W P-5'-P-CCNC: 15 U/L (ref 10–37)
AVG GLU, 10930: 138 MG/DL (ref 91–123)
BASOPHILS # BLD: 0.1 K/UL (ref 0–0.2)
BASOPHILS NFR BLD: 2 % (ref 0–2)
BILIRUB SERPL-MCNC: 0.3 MG/DL (ref 0.2–1.2)
BUN SERPL-MCNC: 14 MG/DL (ref 6–22)
CALCIUM SERPL-MCNC: 9.8 MG/DL (ref 8.4–10.4)
CHLORIDE SERPL-SCNC: 97 MMOL/L (ref 98–110)
CHOLEST SERPL-MCNC: 204 MG/DL (ref 110–200)
CO2 SERPL-SCNC: 27 MMOL/L (ref 20–32)
CREAT SERPL-MCNC: 1 MG/DL (ref 0.5–1.2)
EOSINOPHIL # BLD: 0.3 K/UL (ref 0–0.5)
EOSINOPHIL NFR BLD: 6 % (ref 0–6)
ERYTHROCYTE [DISTWIDTH] IN BLOOD BY AUTOMATED COUNT: 14.9 % (ref 10–16)
GFRAA, 66117: >60
GFRNA, 66118: >60
GLOBULIN,GLOB: 2.9 G/DL (ref 2–4)
GLUCOSE SERPL-MCNC: 89 MG/DL (ref 65–99)
GRANULOCYTES,GRANS: 48 % (ref 40–75)
HBA1C MFR BLD HPLC: 6.4 % (ref 4.8–5.9)
HCT VFR BLD AUTO: 45.5 % (ref 39.3–51.6)
HDLC SERPL-MCNC: 44 MG/DL (ref 40–59)
HGB BLD-MCNC: 14.1 G/DL (ref 13.1–17.2)
LDLC SERPL CALC-MCNC: 141 MG/DL (ref 50–99)
LYMPHOCYTES, LYMLT: 30 % (ref 27–45)
MCH RBC QN AUTO: 25 PG (ref 26–34)
MCHC RBC AUTO-ENTMCNC: 31 G/DL (ref 32–36)
MCV RBC AUTO: 80 FL (ref 80–95)
MONOCYTES # BLD: 0.9 K/UL (ref 0.1–0.9)
MONOCYTES NFR BLD: 15 % (ref 3–9)
NEUTROPHILS # BLD AUTO: 2.8 K/UL (ref 1.8–7.7)
PLATELET # BLD AUTO: 178 K/UL (ref 140–440)
POTASSIUM SERPL-SCNC: 4.4 MMOL/L (ref 3.5–5.5)
PROT SERPL-MCNC: 7 G/DL (ref 6.4–8.3)
RBC # BLD AUTO: 5.72 M/UL (ref 3.8–5.8)
SODIUM SERPL-SCNC: 140 MMOL/L (ref 133–145)
TRIGL SERPL-MCNC: 96 MG/DL (ref 40–149)
VLDLC SERPL CALC-MCNC: 19 MG/DL (ref 8–30)
WBC # BLD AUTO: 5.8 K/UL (ref 4–11)

## 2017-04-18 RX ORDER — ATORVASTATIN CALCIUM 20 MG/1
20 TABLET, FILM COATED ORAL DAILY
Qty: 90 TAB | Refills: 1 | Status: SHIPPED | OUTPATIENT
Start: 2017-04-18 | End: 2017-11-07

## 2017-04-18 RX ORDER — ERGOCALCIFEROL 1.25 MG/1
50000 CAPSULE ORAL
Qty: 12 CAP | Refills: 1 | Status: SHIPPED | OUTPATIENT
Start: 2017-04-18 | End: 2017-11-07

## 2017-04-18 NOTE — TELEPHONE ENCOUNTER
Spoke with Calvin Briones, Verified 2 patient identifiers. Spoke with patient in regards to lab results. Relayed PA's notes. Patient acknowledges understanding and voices no further questions or concerns at this time.

## 2017-04-18 NOTE — TELEPHONE ENCOUNTER
Please advise his labs show sustained elevated cholesterol for over 1 year, at this point, he should start cholesterol medication, called in lipitor 20 mg to pharmacy take once nightly, call if has muscle cramping or any other side effects    His labs also show low vitamin D, calling in Vitamin D 50 k intl units once weekly    Labs also show prediabetes, he is 0.1 point away from diabetes, could start medication to prevent progression and/or he can do trial diet and exercise to include whole wheat foods, less processed carbs such as cookies/cakes/candies/white bread/pasta/rice etc.  More fresh fruits and veggies, goal of 150 minutes of moderate intensity aerobic exercise weekly

## 2017-04-19 DIAGNOSIS — R53.82 CHRONIC FATIGUE: ICD-10-CM

## 2017-04-19 DIAGNOSIS — E78.00 PURE HYPERCHOLESTEROLEMIA: ICD-10-CM

## 2017-07-11 ENCOUNTER — OFFICE VISIT (OUTPATIENT)
Dept: FAMILY MEDICINE CLINIC | Facility: CLINIC | Age: 47
End: 2017-07-11

## 2017-07-11 ENCOUNTER — TELEPHONE (OUTPATIENT)
Dept: FAMILY MEDICINE CLINIC | Facility: CLINIC | Age: 47
End: 2017-07-11

## 2017-07-11 VITALS
OXYGEN SATURATION: 97 % | TEMPERATURE: 97.4 F | RESPIRATION RATE: 16 BRPM | SYSTOLIC BLOOD PRESSURE: 118 MMHG | HEART RATE: 84 BPM | DIASTOLIC BLOOD PRESSURE: 83 MMHG | BODY MASS INDEX: 26.95 KG/M2 | HEIGHT: 74 IN | WEIGHT: 210 LBS

## 2017-07-11 DIAGNOSIS — R73.9 ELEVATED BLOOD SUGAR: ICD-10-CM

## 2017-07-11 DIAGNOSIS — R73.03 PREDIABETES: ICD-10-CM

## 2017-07-11 DIAGNOSIS — J84.9 ILD (INTERSTITIAL LUNG DISEASE) (HCC): ICD-10-CM

## 2017-07-11 DIAGNOSIS — F98.8 ADD (ATTENTION DEFICIT DISORDER): ICD-10-CM

## 2017-07-11 DIAGNOSIS — L30.9 DERMATITIS: ICD-10-CM

## 2017-07-11 DIAGNOSIS — D72.10 EOSINOPHILIA: ICD-10-CM

## 2017-07-11 DIAGNOSIS — E55.9 VITAMIN D DEFICIENCY: ICD-10-CM

## 2017-07-11 DIAGNOSIS — D86.0 PULMONARY SARCOIDOSIS (HCC): ICD-10-CM

## 2017-07-11 DIAGNOSIS — F32.4 MAJOR DEPRESSIVE DISORDER WITH SINGLE EPISODE, IN PARTIAL REMISSION (HCC): Primary | ICD-10-CM

## 2017-07-11 DIAGNOSIS — E78.00 PURE HYPERCHOLESTEROLEMIA: ICD-10-CM

## 2017-07-11 RX ORDER — DEXTROAMPHETAMINE SACCHARATE, AMPHETAMINE ASPARTATE, DEXTROAMPHETAMINE SULFATE AND AMPHETAMINE SULFATE 2.5; 2.5; 2.5; 2.5 MG/1; MG/1; MG/1; MG/1
10 TABLET ORAL 2 TIMES DAILY
Qty: 60 TAB | Refills: 0 | Status: SHIPPED | OUTPATIENT
Start: 2017-07-11 | End: 2017-11-07 | Stop reason: SDUPTHER

## 2017-07-11 RX ORDER — ALBUTEROL SULFATE 90 UG/1
2 AEROSOL, METERED RESPIRATORY (INHALATION)
Qty: 1 INHALER | Refills: 6 | Status: SHIPPED | OUTPATIENT
Start: 2017-07-11 | End: 2017-11-07 | Stop reason: SDUPTHER

## 2017-07-11 RX ORDER — TRIAMCINOLONE ACETONIDE 1 MG/G
OINTMENT TOPICAL 2 TIMES DAILY
Qty: 80 G | Refills: 1 | Status: SHIPPED | OUTPATIENT
Start: 2017-07-11 | End: 2017-11-07 | Stop reason: SDUPTHER

## 2017-07-11 RX ORDER — ATORVASTATIN CALCIUM 20 MG/1
20 TABLET, FILM COATED ORAL DAILY
Qty: 90 TAB | Refills: 1 | Status: CANCELLED | OUTPATIENT
Start: 2017-07-11

## 2017-07-11 NOTE — MR AVS SNAPSHOT
Visit Information Date & Time Provider Department Dept. Phone Encounter #  
 7/11/2017  1:00 PM Adi Wahl [facility] [phone] [id number] Follow-up Instructions Return in about 3 months (around 10/11/2017) for routine care with me. Upcoming Health Maintenance Date Due DTaP/Tdap/Td series (1 - Tdap) 11/10/1992 INFLUENZA AGE 9 TO ADULT 8/1/2017 Allergies as of 7/11/2017  Review Complete On: 4/11/2017 By: LIBERTY Humphrey No Known Allergies Current Immunizations  Reviewed on 9/20/2016 No immunizations on file. Not reviewed this visit You Were Diagnosed With   
  
 Codes Comments Major depressive disorder with single episode, in partial remission ([facility]    -  Primary ICD-10-CM: F32.4 ICD-9-CM: 296.25   
 ADD (attention deficit disorder)     ICD-10-CM: F98.8 ICD-9-CM: 314.00 ILD (interstitial lung disease) ([facility]     ICD-10-CM: J84.9 ICD-9-CM: 801 Pulmonary sarcoidosis ([facility]     ICD-10-CM: D86.0 ICD-9-CM: 135, 517.8 Eosinophilia     ICD-10-CM: D72.1 ICD-9-CM: 536. 3 Prediabetes     ICD-10-CM: R73.03 
ICD-9-CM: 790.29 Pure hypercholesterolemia     ICD-10-CM: E78.00 ICD-9-CM: 272.0 Vitamin D deficiency     ICD-10-CM: E55.9 ICD-9-CM: 268.9 Dermatitis     ICD-10-CM: L30.9 ICD-9-CM: 692.9 Adult ADHD     ICD-10-CM: F90.9 ICD-9-CM: 314.01 Vitals BP Pulse Temp Resp Height(growth percentile) Weight(growth percentile) 118/83 (BP 1 Location: Right arm, BP Patient Position: Sitting) 84 97.4 °F (36.3 °C) (Oral) 16 6' 2\" (1.88 m) 210 lb (95.3 kg) SpO2 BMI Smoking Status 97% 26.96 kg/m2 Current Every Day Smoker BMI and BSA Data Body Mass Index Body Surface Area  
 26.96 kg/m 2 2.23 m 2 Preferred Pharmacy Pharmacy Name Phone [id number]- [address], [address] [phone] Your Updated Medication List  
  
   
 This list is accurate as of: 7/11/17  1:28 PM.  Always use your most recent med list.  
  
  
  
  
 albuterol 90 mcg/actuation inhaler Commonly known as:  PROVENTIL HFA, VENTOLIN HFA, PROAIR HFA Take 2 Puffs by inhalation every four (4) hours as needed for Wheezing. atorvastatin 20 mg tablet Commonly known as:  LIPITOR Take 1 Tab by mouth daily. buPROPion  mg tablet Commonly known as:  Karl Zandra Take 1 Tab by mouth every morning. dextroamphetamine-amphetamine 10 mg tablet Commonly known as:  ADDERALL Take 1 Tab (10 mg total) by mouth two (2) times a day. Max Daily Amount: 20 mg  
  
 ergocalciferol 50,000 unit capsule Commonly known as:  ERGOCALCIFEROL Take 1 Cap by mouth every seven (7) days. predniSONE 20 mg tablet Commonly known as:  DELTASONE  
TAKE 1 TAB BY MOUTH DAILY. triamcinolone acetonide 0.1 % ointment Commonly known as:  KENALOG Apply  to affected area two (2) times a day. use thin layer Prescriptions Printed Refills  
 dextroamphetamine-amphetamine (ADDERALL) 10 mg tablet 0 Sig: Take 1 Tab (10 mg total) by mouth two (2) times a day. Max Daily Amount: 20 mg  
 Class: Print Route: Oral  
  
Prescriptions Sent to Pharmacy Refills  
 triamcinolone acetonide (KENALOG) 0.1 % ointment 1 Sig: Apply  to affected area two (2) times a day. use thin layer Class: Normal  
 Pharmacy: [address] [place] [address], [address] [place] [address] Ph #: [phone] Route: Topical  
 albuterol (PROVENTIL HFA, VENTOLIN HFA, PROAIR HFA) 90 mcg/actuation inhaler 6 Sig: Take 2 Puffs by inhalation every four (4) hours as needed for Wheezing. Class: Normal  
 Pharmacy: [address] [place] [address], [address] [place] [address] Ph #: [phone] Route: Inhalation Follow-up Instructions Return in about 3 months (around 10/11/2017) for routine care with me. Patient Instructions Depression and Chronic Disease: Care Instructions Your Care Instructions A chronic disease is one that you have for a long time. Some chronic diseases can be controlled, but they usually cannot be cured. Depression is common in people with chronic diseases, but it often goes unnoticed. Many people have concerns about seeking treatment for a mental health problem. You may think it's a sign of weakness, or you don't want people to know about it. It's important to overcome these reasons for not seeking treatment. Treating depression or anxiety is good for your health. Follow-up care is a key part of your treatment and safety. Be sure to make and go to all appointments, and call your doctor if you are having problems. It's also a good idea to know your test results and keep a list of the medicines you take. How can you care for yourself at home? Watch for symptoms of depression The symptoms of depression are often subtle at first. You may think they are caused by your disease rather than depression. Or you may think it is normal to be depressed when you have a chronic disease. If you are depressed you may: · Feel sad or hopeless. · Feel guilty or worthless. · Not enjoy the things you used to enjoy. · Feel hopeless, as though life is not worth living. · Have trouble thinking or remembering. · Have low energy, and you may not eat or sleep well. · Pull away from others. · Think often about death or killing yourself. (Keep the numbers for these national suicide hotlines: 8-677-744-TALK [1-771.103.3770] and 8-899-QLKXOOG [1-600.171.6708]. ) Get treatment By treating your depression, you can feel more hopeful and have more energy. If you feel better, you may take better care of yourself, so your health may improve. · Talk to your doctor if you have any changes in mood during treatment for your disease. · Ask your doctor for help.  Counseling, antidepressant medicine, or a combination of the two can help most people with depression. Often a combination works best. Counseling can also help you cope with having a chronic disease. When should you call for help? Call 911 anytime you think you may need emergency care. For example, call if: 
· You feel like hurting yourself or someone else. · Someone you know has depression and is about to attempt or is attempting suicide. Call your doctor now or seek immediate medical care if: 
· You hear voices. · Someone you know has depression and: 
¨ Starts to give away his or her possessions. ¨ Uses illegal drugs or drinks alcohol heavily. ¨ Talks or writes about death, including writing suicide notes or talking about guns, knives, or pills. ¨ Starts to spend a lot of time alone. ¨ Acts very aggressively or suddenly appears calm. Watch closely for changes in your health, and be sure to contact your doctor if: 
· You do not get better as expected. Where can you learn more? Go to http://ramon-josue.info/. Enter D838 in the search box to learn more about \"Depression and Chronic Disease: Care Instructions. \" Current as of: July 26, 2016 Content Version: 11.3 © 5305-7880 [employer]. Care instructions adapted under license by [employer] (which disclaims liability or warranty for this information). If you have questions about a medical condition or this instruction, always ask your healthcare professional. [facility] any warranty or liability for your use of this information. Introducing [facility]! Ivette Rascon introduces TaDaweb patient portal. Now you can access parts of your medical record, email your doctor's office, and request medication refills online. 1. In your internet browser, go to [email or web address] 2. Click on the First Time User? Click Here link in the Sign In box. You will see the New Member Sign Up page. 3. Enter your Protek-dor Access Code exactly as it appears below. You will not need to use this code after youve completed the sign-up process. If you do not sign up before the expiration date, you must request a new code. · Protek-dor Access Code: [email or web address] Expires: 10/9/2017  1:28 PM 
 
4. Enter the last four digits of your Social Security Number (xxxx) and Date of Birth (mm/dd/yyyy) as indicated and click Submit. You will be taken to the next sign-up page. 5. Create a Protek-dor ID. This will be your Protek-dor login ID and cannot be changed, so think of one that is secure and easy to remember. 6. Create a Protek-dor password. You can change your password at any time. 7. Enter your Password Reset Question and Answer. This can be used at a later time if you forget your password. 8. Enter your e-mail address. You will receive e-mail notification when new information is available in [address]. 9. Click Sign Up. You can now view and download portions of your medical record. 10. Click the Download Summary menu link to download a portable copy of your medical information. If you have questions, please visit the Frequently Asked Questions section of the Protek-dor website. Remember, Protek-dor is NOT to be used for urgent needs. For medical emergencies, dial 911. Now available from your iPhone and Android! Please provide this summary of care documentation to your next provider. Your primary care clinician is listed as Eliazar Crawford. If you have any questions after today's visit, please call [phone].

## 2017-07-11 NOTE — PROGRESS NOTES
History and Physical    Patient: Jj Glasgow MRN: [id number]  SSN: [id number]    YOB: 1970  Age: 40 y.o. Sex: male      Subjective:      Jj Glasgow is a 40 y.o. male who is presenting today for follow up of ADD, depression, interstitial lung disease/sarcoidosis etc.    In terms of his depression, he is on Wellbutrin, feeling much better. Denies si/hi. In terms of his ADD, he is on Adderall 10 mg BID, but is not using it often, still has pills left from fill several months ago, states only uses it when he has a work project that needs to be completed. Symptoms well controlled.      Patient has a h/o interstitial lung disease and sarcoidosis, for which he takes  albuterol only, requesting refill. He was being seen by pulmonology, and was suppose to have recent PFTs, but providers left that office. Per chart review, patient was suppose to have a PPD, was reported to be negative. Patient was also suppose to be on Bactrim for PCP prophylaxis, however, patient admits he stopped taking it. Patient was given referral info to pulmonology but he states he called and they never called him back. He took himself off of Prednisone as he felt it was not helping, has not noticed worsening of cough, sob etc.  Only uses albuterol several times a week. Patient was referred to hematology for h/o persistent eosinophilia, patient has never been seen by them. Last recheck eosinophils were wnl. Patient has a h/o HLD, he was on Lipitor. However, he started exercising and lost 11 lbs since last visit and stopped several weeks ago. Patient has a h/o vitamin D Def, was on vitamin D supplementation but was not aware there were refills. Patient has preDM, not currently on medication. Has lost 11 lbs since last visit. Patient requesting refill of kenalog for h/o dermatitis on his arms.          PMH:  Past Medical History:   Diagnosis Date    Eczema     Eosinophilia     ILD (interstitial lung disease) ([facility]     Interstitial lung disease ([facility]     Pulmonary lesion     Sarcoidosis (HCC)      Past Surgical History:   Procedure Laterality Date    HX HEENT      wisdom teeth        FamHx:  Family History   Problem Relation Age of Onset    Arthritis-osteo Mother     Asthma Mother     No Known Problems Father     Migraines Sister     No Known Problems Sister        Socialhx:  Social History   Substance Use Topics    Smoking status: Current Every Day Smoker     Packs/day: 0.25     Years: 16.00     Types: Cigarettes     Start date: 3/6/2000    Smokeless tobacco: Never Used    Alcohol use 0.0 oz/week     0 Standard drinks or equivalent per week      Comment: once a month        Meds:  Prior to Admission medications    Medication Sig Start Date End Date Taking? Authorizing Provider   triamcinolone acetonide (KENALOG) 0.1 % ointment Apply  to affected area two (2) times a day. use thin layer 7/11/17  Yes Jessica Pumphrey V, PA   albuterol (PROVENTIL HFA, VENTOLIN HFA, PROAIR HFA) 90 mcg/actuation inhaler Take 2 Puffs by inhalation every four (4) hours as needed for Wheezing. 7/11/17  Yes Jessica Pumphrey V, PA   dextroamphetamine-amphetamine (ADDERALL) 10 mg tablet Take 1 Tab (10 mg total) by mouth two (2) times a day. Max Daily Amount: 20 mg 7/11/17  Yes Jessica Pumphrey V, PA   atorvastatin (LIPITOR) 20 mg tablet Take 1 Tab by mouth daily. 4/18/17  Yes Jessica Pumphrey V, PA   ergocalciferol (ERGOCALCIFEROL) 50,000 unit capsule Take 1 Cap by mouth every seven (7) days. 4/18/17  Yes Jessica Pumphrey V, PA   buPROPion XL (WELLBUTRIN XL) 150 mg tablet Take 1 Tab by mouth every morning. 4/11/17  Yes Jessica Pumphrey V, PA   predniSONE (DELTASONE) 20 mg tablet TAKE 1 TAB BY MOUTH DAILY.  8/25/16   Historical Provider        Allergies:  No Known Allergies    Review of Systems:  Items in bold are positive:  Constitutional: negative for fevers, chills and malaise  Eyes: negative for visual disturbance  Ears, Nose, Mouth, Throat, and Face: negative for nasal congestion  Respiratory: negative for cough or SOB  Cardiovascular: negative for chest pain, chest pressure/discomfort  Gastrointestinal: negative for nausea, vomiting, melena, BRBPR, diarrhea, constipation and abdominal pain  Genitourinary:negative for frequency, dysuria, hesitancy and decreased stream  Musculoskeletal:negative for myalgias and arthralgias  Neurological: negative for headaches, dizziness and paresthesia  Psych: easily distracted, difficulty concentrating,forgetful-improved/stable, Depression-controlled; denies si/hi  Skin: itchy patches to arms    Objective:     Vitals:    07/11/17 1316   BP: 118/83   Pulse: 84   Resp: 16   Temp: 97.4 °F (36.3 °C)   TempSrc: Oral   SpO2: 97%   Weight: 210 lb (95.3 kg)   Height: 6' 2\" (1.88 m)        Physical Exam:  GENERAL: alert, cooperative, no distress, appears stated age  HEENT: EYE: conjunctivae/corneas clear. EOM's intact. EAR: TM's pearly gray bilaterally NOSE: Nasal mucosa pink and moist bilaterally, THROAT: no erythema or edema  THYROID: no thyromegaly  NECK: no adenopathy  LUNG: clear to auscultation bilaterally  HEART: regular rate and rhythm, S1, S2 normal, no murmur, click, rub or gallop  ABDOMEN: soft, non-tender. Bowel sounds normal. No masses  EXTREMITIES:  extremities normal, atraumatic, no cyanosis or edema  NEUROLOGIC: AOx3. Gait normal.  PSYCH: mood: neutral affect: neutral          Assessment and Plan:       ICD-10-CM ICD-9-CM    1. Major depressive disorder with single episode, in partial remission ([facility] F32.4 296.25    2. ADD (attention deficit disorder) F98.8 314.00 dextroamphetamine-amphetamine (ADDERALL) 10 mg tablet   3. ILD (interstitial lung disease) ([facility]) J84.9 515 albuterol (PROVENTIL HFA, VENTOLIN HFA, PROAIR HFA) 90 mcg/actuation inhaler   4. Pulmonary sarcoidosis ([facility] D86.0 135      517.8    5. Eosinophilia D72.1 288.3 CBC WITH AUTOMATED DIFF   6.  Prediabetes R73.03 790.29    7. Pure hypercholesterolemia E78.00 272.0 LIPID PANEL      METABOLIC PANEL, COMPREHENSIVE   8. Vitamin D deficiency E55.9 268.9 VITAMIN D, 25 HYDROXY   9. Dermatitis L30.9 692.9 triamcinolone acetonide (KENALOG) 0.1 % ointment   10. Elevated blood sugar R73.9 790.29 HEMOGLOBIN A1C WITH EAG         Medical Decision Making:  Depression- continue current therapy- well controlled    ADD-  Refill Adderall  -  reviewed, only Adderall from this office on report, symptoms well controlled     ILD/Sarcoidosis- will have LPNs work on referral to pulmonology-patient to have PPD placed next week    Eosinophilia- CBC    PreDM- labs- needs follow up    HLD- labs- needs follow up    Vitamin D def- labs- needs follow up    Dermatitis- refill kenalog    *patient to return next week for fasting labs and PPD placement/read      Follow-up Disposition:  Return in about 3 months (around 10/11/2017) for routine care with me. Patient acknowledges understanding of instructions and acknowledges understanding to call back if current symptoms worsen or new symptoms arise. Patient acknowledges and agrees with plan.     Signed By: LIBERTY Hinds     July 11, 2017

## 2017-07-11 NOTE — PROGRESS NOTES
Gaby Kearns is a 40 y.o.  male presents today for office visit for follow up. Pt is in Room # 9      1. Have you been to the ER, urgent care clinic since your last visit? Hospitalized since your last visit? No    2. Have you seen or consulted any other health care providers outside of the [address] [place] [address] since your last visit? Include any pap smears or colon screening. No    No Patient Care Coordination Note on file.

## 2017-07-11 NOTE — PATIENT INSTRUCTIONS
Depression and Chronic Disease: Care Instructions  Your Care Instructions  A chronic disease is one that you have for a long time. Some chronic diseases can be controlled, but they usually cannot be cured. Depression is common in people with chronic diseases, but it often goes unnoticed. Many people have concerns about seeking treatment for a mental health problem. You may think it's a sign of weakness, or you don't want people to know about it. It's important to overcome these reasons for not seeking treatment. Treating depression or anxiety is good for your health. Follow-up care is a key part of your treatment and safety. Be sure to make and go to all appointments, and call your doctor if you are having problems. It's also a good idea to know your test results and keep a list of the medicines you take. How can you care for yourself at home? Watch for symptoms of depression  The symptoms of depression are often subtle at first. You may think they are caused by your disease rather than depression. Or you may think it is normal to be depressed when you have a chronic disease. If you are depressed you may:  · Feel sad or hopeless. · Feel guilty or worthless. · Not enjoy the things you used to enjoy. · Feel hopeless, as though life is not worth living. · Have trouble thinking or remembering. · Have low energy, and you may not eat or sleep well. · Pull away from others. · Think often about death or killing yourself. (Keep the numbers for these national suicide hotlines: 9-676-486-TALK [1-418.701.7667] and 8-933-IPSDMKV [1-279.987.3356]. )  Get treatment  By treating your depression, you can feel more hopeful and have more energy. If you feel better, you may take better care of yourself, so your health may improve. · Talk to your doctor if you have any changes in mood during treatment for your disease. · Ask your doctor for help.  Counseling, antidepressant medicine, or a combination of the two can help most people with depression. Often a combination works best. Counseling can also help you cope with having a chronic disease. When should you call for help? Call 911 anytime you think you may need emergency care. For example, call if:  · You feel like hurting yourself or someone else. · Someone you know has depression and is about to attempt or is attempting suicide. Call your doctor now or seek immediate medical care if:  · You hear voices. · Someone you know has depression and:  ¨ Starts to give away his or her possessions. ¨ Uses illegal drugs or drinks alcohol heavily. ¨ Talks or writes about death, including writing suicide notes or talking about guns, knives, or pills. ¨ Starts to spend a lot of time alone. ¨ Acts very aggressively or suddenly appears calm. Watch closely for changes in your health, and be sure to contact your doctor if:  · You do not get better as expected. Where can you learn more? Go to http://ramon-josue.info/. Enter N488 in the search box to learn more about \"Depression and Chronic Disease: Care Instructions. \"  Current as of: July 26, 2016  Content Version: 11.3  © 3125-6883 [employer]. Care instructions adapted under license by [employer] (which disclaims liability or warranty for this information). If you have questions about a medical condition or this instruction, always ask your healthcare professional. [address] any warranty or liability for your use of this information.

## 2017-07-11 NOTE — TELEPHONE ENCOUNTER
Spoke with patient about referral information, and faxed over information to Dr. Niranjan Sanders office 7/7/17

## 2017-07-11 NOTE — TELEPHONE ENCOUNTER
Please contact patient to provide pulmonary referral info and please follow up to ensure we have faxed over info to ensure he gets seen

## 2017-07-25 DIAGNOSIS — R73.9 ELEVATED BLOOD SUGAR: ICD-10-CM

## 2017-07-25 DIAGNOSIS — D72.10 EOSINOPHILIA: ICD-10-CM

## 2017-07-25 DIAGNOSIS — E78.00 PURE HYPERCHOLESTEROLEMIA: ICD-10-CM

## 2017-07-25 DIAGNOSIS — E55.9 VITAMIN D DEFICIENCY: ICD-10-CM

## 2017-11-07 ENCOUNTER — OFFICE VISIT (OUTPATIENT)
Dept: FAMILY MEDICINE CLINIC | Facility: CLINIC | Age: 47
End: 2017-11-07

## 2017-11-07 ENCOUNTER — TELEPHONE (OUTPATIENT)
Dept: FAMILY MEDICINE CLINIC | Facility: CLINIC | Age: 47
End: 2017-11-07

## 2017-11-07 VITALS
BODY MASS INDEX: 26.05 KG/M2 | OXYGEN SATURATION: 95 % | WEIGHT: 203 LBS | TEMPERATURE: 98.1 F | RESPIRATION RATE: 16 BRPM | DIASTOLIC BLOOD PRESSURE: 82 MMHG | HEIGHT: 74 IN | HEART RATE: 97 BPM | SYSTOLIC BLOOD PRESSURE: 113 MMHG

## 2017-11-07 DIAGNOSIS — D72.10 EOSINOPHILIA: ICD-10-CM

## 2017-11-07 DIAGNOSIS — F98.8 ATTENTION DEFICIT DISORDER, UNSPECIFIED HYPERACTIVITY PRESENCE: Primary | ICD-10-CM

## 2017-11-07 DIAGNOSIS — J84.9 ILD (INTERSTITIAL LUNG DISEASE) (HCC): ICD-10-CM

## 2017-11-07 DIAGNOSIS — L30.9 DERMATITIS: ICD-10-CM

## 2017-11-07 DIAGNOSIS — E78.00 PURE HYPERCHOLESTEROLEMIA: ICD-10-CM

## 2017-11-07 DIAGNOSIS — F32.4 MAJOR DEPRESSIVE DISORDER WITH SINGLE EPISODE, IN PARTIAL REMISSION (HCC): ICD-10-CM

## 2017-11-07 DIAGNOSIS — R73.09 ELEVATED HEMOGLOBIN A1C: ICD-10-CM

## 2017-11-07 DIAGNOSIS — E55.9 VITAMIN D DEFICIENCY: ICD-10-CM

## 2017-11-07 RX ORDER — ALBUTEROL SULFATE 90 UG/1
2 AEROSOL, METERED RESPIRATORY (INHALATION)
Qty: 1 INHALER | Refills: 6 | Status: SHIPPED | OUTPATIENT
Start: 2017-11-07

## 2017-11-07 RX ORDER — DEXTROAMPHETAMINE SACCHARATE, AMPHETAMINE ASPARTATE, DEXTROAMPHETAMINE SULFATE AND AMPHETAMINE SULFATE 2.5; 2.5; 2.5; 2.5 MG/1; MG/1; MG/1; MG/1
10 TABLET ORAL 2 TIMES DAILY
Qty: 60 TAB | Refills: 0 | Status: SHIPPED | OUTPATIENT
Start: 2017-11-07 | End: 2018-08-07 | Stop reason: SDUPTHER

## 2017-11-07 RX ORDER — TRIAMCINOLONE ACETONIDE 1 MG/G
OINTMENT TOPICAL 2 TIMES DAILY
Qty: 80 G | Refills: 1 | Status: SHIPPED | OUTPATIENT
Start: 2017-11-07 | End: 2018-11-02 | Stop reason: SDUPTHER

## 2017-11-07 NOTE — PROGRESS NOTES
Keila Tolbert is a 40 y.o.  male presents today for office visit for follow up. Pt would also like to discuss Refill medication. Pt is not fasting. Pt is in Room # 8      1. Have you been to the ER, urgent care clinic since your last visit? Hospitalized since your last visit? No    2. Have you seen or consulted any other health care providers outside of the [address] [place] [address] since your last visit? Include any pap smears or colon screening.  Sleep specialist    Upcoming Appts  Sleep specialist-Friday     Health Maintenance reviewed patient declined flu vx today       VORB:   Orders Placed This Encounter    CBC WITH AUTOMATED DIFF    METABOLIC PANEL, COMPREHENSIVE    LIPID PANEL    HEMOGLOBIN A1C WITH EAG    VITAMIN D, 25 HYDROXY   Carol Amaya LPN

## 2017-11-07 NOTE — TELEPHONE ENCOUNTER
Patient would like to talk to Alexander Merritt regarding a medication that he used to get. He forgot to ask her at his visit today. Please call him back at # [phone].

## 2017-11-07 NOTE — PROGRESS NOTES
History and Physical    Patient: Eva Snow MRN: [id number]  SSN: [id number]    YOB: 1970  Age: 40 y.o. Sex: male      Subjective:      Eva Snow is a 40 y.o. male who is presenting for follow up ADD, depression, ILD etc    In terms of his ADD, he is on Adderall 10 mg BID, but is not using it often, states only uses it when he has a work project that needs to be completed. Symptoms well controlled, requesting refill. In terms of his depression, he was on Wellbutrin, however stopped taking it as felt did not need it, feeling good without it. Denies si/hi. In terms of his ILD, he is currently seeing pulmonology. He states that he has been having a hard time breathing, feels SOB frequently, does not have an albuterol inhaler, requesting refills. Having a bronchoscopy done in 4 days. Patient was referred to hematology for h/o persistent eosinophilia, patient has never been seen by them. Last recheck eosinophils were wnl. Patient requesting refill of kenalog and selenium sulfide for itchy patches to his chest, he states using these 2 helps to clear it up.       PMH:  Past Medical History:   Diagnosis Date    Eczema     Eosinophilia     [facility]     Interstitial lung disease ([address]     Pulmonary lesion     Sarcoidosis      Past Surgical History:   Procedure Laterality Date    HX HEENT      wisdom teeth        FamHx:  Family History   Problem Relation Age of Onset    Arthritis-osteo Mother     Asthma Mother     No Known Problems Father    [facility] Sister     No Known Problems Sister        Socialhx:  Social History   Substance Use Topics    Smoking status: Former Smoker     Packs/day: 0.25     Years: 16.00     Types: Cigarettes     Start date: 3/6/2000    Smokeless tobacco: Never Used    Alcohol use 0.0 oz/week     0 Standard drinks or equivalent per week      Comment: once a month        Meds:  Prior to Admission medications Medication Sig Start Date End Date Taking? Authorizing Provider   dextroamphetamine-amphetamine (ADDERALL) 10 mg tablet Take 1 Tab (10 mg total) by mouth two (2) times a dayEarliest Fill Date: 11/7/17. Max Daily Amount: 20 mg 11/7/17  Yes Jessica Pumphrey V, PA   albuterol (PROVENTIL HFA, VENTOLIN HFA, PROAIR HFA) 90 mcg/actuation inhaler Take 2 Puffs by inhalation every four (4) hours as needed for Wheezing. 11/7/17  Yes Jessica Pumphrey V, PA   triamcinolone acetonide (KENALOG) 0.1 % ointment Apply  to affected area two (2) times a day. use thin layer 11/7/17  Yes Jessica Pumphrey V, PA   diph,Pertuss,Acell,,Tet Vac-PF (ADACEL) 2 Lf-(2.5-5-3-5 mcg)-5Lf/0.5 mL susp 0.5 mL by IntraMUSCular route once for 1 dose. 11/7/17 11/7/17 Yes Jessica Pumphrey V, PA   Selenium Sulfide 2.25 % topical foam Apply  to affected area every Monday and Thursday.  11/9/17  Yes LIBERTY Scott        Allergies:  No Known Allergies    Review of Systems:  Items in bold are positive:  Constitutional: negative for fevers, chills and malaise  Eyes: negative for visual disturbance  Ears, Nose, Mouth, Throat, and Face: negative for nasal congestion  Respiratory: SOB, negative for cough   Cardiovascular: negative for chest pain, chest pressure/discomfort  Gastrointestinal: negative for nausea, vomiting, melena, BRBPR, diarrhea, constipation and abdominal pain  Genitourinary:negative for frequency, dysuria, hesitancy and decreased stream  Musculoskeletal:negative for myalgias and arthralgias  Neurological: negative for headaches, dizziness and paresthesia  Psych: easily distracted, difficulty concentrating,forgetful -improved/stable, Depression-stable; denies si/hi  Skin: itchy patches to chest and back      Objective:     Vitals:    11/07/17 1122   BP: 113/82   Pulse: 97   Resp: 16   Temp: 98.1 °F (36.7 °C)   TempSrc: Oral   SpO2: 95%   Weight: 203 lb (92.1 kg)   Height: 6' 2\" (1.88 m)        Physical Exam:  GENERAL: alert, cooperative, no distress, appears stated age  [de-identified]: EYE: conjunctivae/corneas clear. EOM's intact. EAR: TM's pearly gray bilaterally NOSE: Nasal mucosa pink and moist bilaterally, THROAT: no erythema or edema  THYROID: no thyromegaly  NECK: no adenopathy  LUNG: clear to auscultation bilaterally  HEART: regular rate and rhythm, S1, S2 normal, no murmur, click, rub or gallop  ABDOMEN: soft, non-tender. Bowel sounds normal. No masses  EXTREMITIES:  extremities normal, atraumatic, no cyanosis or edema  NEUROLOGIC: AOx3. Gait normal.  PSYCH: mood: neutral affect: neutral  SKIN: 2 large patches of hyperpigmentation and lichenification to anterior chest      Assessment and Plan:       ICD-10-CM ICD-9-CM    1. Attention deficit disorder, unspecified hyperactivity presence F98.8 314.00 dextroamphetamine-amphetamine (ADDERALL) 10 mg tablet   2. Major depressive disorder with single episode[facility] F32.4 296.25    3. ILD (interstitial lung disease) ([facility]) J84.9 515 albuterol (PROVENTIL HFA, VENTOLIN HFA, PROAIR HFA) 90 mcg/actuation inhaler   4. Dermatitis L30.9 692.9 triamcinolone acetonide (KENALOG) 0.1 % ointment   5. Vitamin D deficiency E55.9 268.9 VITAMIN D, 25 HYDROXY   6. Pure hypercholesterolemia N34.74 973.5 METABOLIC PANEL, COMPREHENSIVE      LIPID PANEL   7. Eosinophilia D72.1 288.3 CBC WITH AUTOMATED DIFF   8. Elevated hemoglobin A1c R73.09 790.29 HEMOGLOBIN A1C WITH EAG         Medical Decision Making:  ADD- refill medication, symptoms stable- reviewed    Depression- continue to monitor- stable off of medication    ILD- refill albuterol + follow up with pulmonology- advised to contact their office regarding the SOB    Dermatitis- refill kenalog + selenium sulfide    *will return fasting    Follow-up Disposition:  Return in about 3 months (around 2/7/2018) for routine care with me.       Patient acknowledges understanding of instructions and acknowledges understanding to call back if current symptoms worsen or new symptoms arise. Patient acknowledges and agrees with plan.     Signed By: LIBERTY Sweeney     November 7, 2017

## 2017-11-07 NOTE — PATIENT INSTRUCTIONS
Interstitial Lung Disease: Care Instructions  Your Care Instructions    Interstitial lung disease is a long-term (chronic) lung disease. It happens because of damage between the air sacs in the lung. The damage scars the lung and causes breathing problems. People with interstitial lung disease get breathless during exercise and may have a dry cough. These problems may get worse slowly or very quickly. Interstitial lung disease can be caused by breathing in dust from asbestos and silica. It also can be caused by infections and some medicines. Sometimes doctors cannot find the cause. You may get medicine to treat the problem. Corticosteroids can sometimes reduce the swelling of lung tissue and prevent more damage. Oxygen treatment may help your condition. Follow-up care is a key part of your treatment and safety. Be sure to make and go to all appointments, and call your doctor if you are having problems. It's also a good idea to know your test results and keep a list of the medicines you take. How can you care for yourself at home? · Do not smoke. Smoking makes interstitial lung disease worse. If you need help quitting, talk to your doctor about stop-smoking programs and medicines. These can increase your chances of quitting for good. · Take your medicines exactly as prescribed. Call your doctor if you have any problems with your medicine. · Get flu and pneumococcal shots. These help prevent lung infection. · Make an exercise plan with help from your doctor or other health professional. Exercise can help you breathe more easily. · Think about joining a support group. This can help you cope with problems caused by interstitial lung disease. When should you call for help? Call your doctor now or seek immediate medical care if:  ? · Your shortness of breath gets worse. ? · You cough up blood. ? · You have severe chest pain. ? Watch closely for changes in your health, and be sure to contact your doctor if you have any problems. Where can you learn more? Go to http://ramon-josue.info/. Enter Q598 in the search box to learn more about \"Interstitial Lung Disease: Care Instructions. \"  Current as of: May 12, 2017  Content Version: 11.4  © 3982-2165 [employer]. Care instructions adapted under license by [employer] (which disclaims liability or warranty for this information). If you have questions about a medical condition or this instruction, always ask your healthcare professional. [address] any warranty or liability for your use of this information.

## 2017-11-07 NOTE — MR AVS SNAPSHOT
Visit Information Date & Time Provider Department Dept. Phone Encounter #  
 11/7/2017 11:30 AM [facility] [phone] [id number] Follow-up Instructions Return in about 3 months (around 2/7/2018) for routine care with me. Upcoming Health Maintenance Date Due DTaP/Tdap/Td series (1 - Tdap) 11/10/1992 Allergies as of 11/7/2017  Review Complete On: 11/7/2017 By: Hershall Goodell, LPN No Known Allergies Current Immunizations  Reviewed on 9/20/2016 No immunizations on file. Not reviewed this visit You Were Diagnosed With   
  
 Codes Comments Attention deficit disorder, unspecified hyperactivity presence    -  Primary ICD-10-CM: F98.8 ICD-9-CM: 314.00 Major depressive disorder with single episode, in partial remission [facility]     ICD-10-CM: F32.4 ICD-9-CM: 296.25   
 ILD (interstitial lung disease) ([facility]     ICD-10-CM: J84.9 ICD-9-CM: 293 Vitamin D deficiency     ICD-10-CM: E55.9 ICD-9-CM: 268.9 Pure hypercholesterolemia     ICD-10-CM: E78.00 ICD-9-CM: 272.0 Eosinophilia     ICD-10-CM: D72.1 ICD-9-CM: 411. 3 Elevated hemoglobin A1c     ICD-10-CM: R73.09 
ICD-9-CM: 790.29 Dermatitis     ICD-10-CM: L30.9 ICD-9-CM: 692.9 Vitals BP Pulse Temp Resp Height(growth percentile) Weight(growth percentile) 113/82 (BP 1 Location: Right arm, BP Patient Position: Sitting) 97 98.1 °F (36.7 °C) (Oral) 16 6' 2\" (1.88 m) 203 lb (92.1 kg) SpO2 BMI Smoking Status 95% 26.06 kg/m2 Former Smoker Vitals History BMI and BSA Data Body Mass Index Body Surface Area 26.06 kg/m 2 2.19 m 2 Preferred Pharmacy Pharmacy Name Phone [id number], [address] [phone] Your Updated Medication List  
  
   
This list is accurate as of: 11/7/17 11:52 AM.  Always use your most recent med list.  
  
  
  
  
 albuterol 90 mcg/actuation inhaler Commonly known as:  PROVENTIL HFA, VENTOLIN HFA, PROAIR HFA Take 2 Puffs by inhalation every four (4) hours as needed for Wheezing. dextroamphetamine-amphetamine 10 mg tablet Commonly known as:  ADDERALL Take 1 Tab (10 mg total) by mouth two (2) times a dayEarliest Fill Date: 17. Max Daily Amount: 20 mg  
  
 diph,Pertuss(Acell),Tet Vac-PF 2 Lf-(2.5-5-3-5 mcg)-5Lf/0.5 mL susp Commonly known as:  ADACEL  
0.5 mL by IntraMUSCular route once for 1 dose. Selenium Sulfide 2.25 % topical foam  
Apply  to affected area every Monday and Thursday. Start taking on:  2017  
  
 triamcinolone acetonide 0.1 % ointment Commonly known as:  KENALOG Apply  to affected area two (2) times a day. use thin layer Prescriptions Printed Refills  
 dextroamphetamine-amphetamine (ADDERALL) 10 mg tablet 0 Sig: Take 1 Tab (10 mg total) by mouth two (2) times a dayEarliest Fill Date: 17. Max Daily Amount: 20 mg  
 Class: Print Route: Oral  
 diph,Pertuss,Acell,,Tet Vac-PF (ADACEL) 2 Lf-(2.5-5-3-5 mcg)-5Lf/0.5 mL susp 0 Si.5 mL by IntraMUSCular route once for 1 dose. Class: Print Route: IntraMUSCular Prescriptions Sent to Pharmacy Refills  
 albuterol (PROVENTIL HFA, VENTOLIN HFA, PROAIR HFA) 90 mcg/actuation inhaler 6 Sig: Take 2 Puffs by inhalation every four (4) hours as needed for Wheezing. Class: Normal  
 Pharmacy: [address] [place] [address], [address] [place] [address] Ph #: [phone] Route: Inhalation  
 triamcinolone acetonide (KENALOG) 0.1 % ointment 1 Sig: Apply  to affected area two (2) times a day. use thin layer Class: Normal  
 Pharmacy: [address] [place] [address], [address] [place] [address] Ph #: [phone] Route: Topical  
 Selenium Sulfide 2.25 % topical foam 3 Starting on: 2017 Sig: Apply  to affected area every Monday and Thursday.   
 Class: Normal  
 [facility], [address] [place] [address] [place] Ph #: [phone] Route: Topical  
  
Follow-up Instructions Return in about 3 months (around 2/7/2018) for routine care with me. To-Do List   
 11/21/2017 Lab:  CBC WITH AUTOMATED DIFF   
  
 11/21/2017 Lab:  HEMOGLOBIN A1C WITH EAG   
  
 11/21/2017 Lab:  LIPID PANEL   
  
 11/21/2017 Lab:  METABOLIC PANEL, COMPREHENSIVE   
  
 11/21/2017 Lab:  VITAMIN D, 25 HYDROXY Patient Instructions Interstitial Lung Disease: Care Instructions Your Care Instructions Interstitial lung disease is a long-term (chronic) lung disease. It happens because of damage between the air sacs in the lung. The damage scars the lung and causes breathing problems. People with interstitial lung disease get breathless during exercise and may have a dry cough. These problems may get worse slowly or very quickly. Interstitial lung disease can be caused by breathing in dust from asbestos and silica. It also can be caused by infections and some medicines. Sometimes doctors cannot find the cause. You may get medicine to treat the problem. Corticosteroids can sometimes reduce the swelling of lung tissue and prevent more damage. Oxygen treatment may help your condition. Follow-up care is a key part of your treatment and safety. Be sure to make and go to all appointments, and call your doctor if you are having problems. It's also a good idea to know your test results and keep a list of the medicines you take. How can you care for yourself at home? · Do not smoke. Smoking makes interstitial lung disease worse. If you need help quitting, talk to your doctor about stop-smoking programs and medicines. These can increase your chances of quitting for good. · Take your medicines exactly as prescribed. Call your doctor if you have any problems with your medicine. · Get flu and pneumococcal shots. These help prevent lung infection. · Make an exercise plan with help from your doctor or other health professional. Exercise can help you breathe more easily. · Think about joining a support group. This can help you cope with problems caused by interstitial lung disease. When should you call for help? Call your doctor now or seek immediate medical care if: 
? · Your shortness of breath gets worse. ? · You cough up blood. ? · You have severe chest pain. ? Watch closely for changes in your health, and be sure to contact your doctor if you have any problems. Where can you learn more? Go to http://ramon-josue.info/. Enter S600 in the search box to learn more about \"Interstitial Lung Disease: Care Instructions. \" Current as of: May 12, 2017 Content Version: 11.4 © 8154-3454 "[employer] Care instructions adapted under license by [employer] (which disclaims liability or warranty for this information). If you have questions about a medical condition or this instruction, always ask your healthcare professional. [place] [address] any warranty or liability for your use of this information. Introducing [facility]! Luis Armando Hernandez introduces [employer] patient portal. Now you can access parts of your medical record, email your doctor's office, and request medication refills online. 1. In your internet browser, go to [email or web address] 2. Click on the First Time User? Click Here link in the Sign In box. You will see the New Member Sign Up page. 3. Enter your [employer] Access Code exactly as it appears below. You will not need to use this code after youve completed the sign-up process. If you do not sign up before the expiration date, you must request a new code. · [employer] Access Code: [email or web address] Expires: 2/5/2018 11:52 AM 
 
4.  Enter the last four digits of your Social Security Number (xxxx) and Date of Birth (mm/dd/yyyy) as indicated and click Submit. You will be taken to the next sign-up page. 5. Create a [employer]iant ID. This will be your [employer] login ID and cannot be changed, so think of one that is secure and easy to remember. 6. Create a [employer] password. You can change your password at any time. 7. Enter your Password Reset Question and Answer. This can be used at a later time if you forget your password. 8. Enter your e-mail address. You will receive e-mail notification when new information is available in [address] 9. Click Sign Up. You can now view and download portions of your medical record. 10. Click the Download Summary menu link to download a portable copy of your medical information. If you have questions, please visit the Frequently Asked Questions section of the [employer] website. Remember, [employer] is NOT to be used for urgent needs. For medical emergencies, dial 911. Now available from your iPhone and Android! Please provide this summary of care documentation to your next provider. Your primary care clinician is listed as Wes Hernandez. If you have any questions after today's visit, please call [phone].

## 2017-11-08 NOTE — TELEPHONE ENCOUNTER
S/w patient, he states he received a medication that he used for a \"growth on his face\", he believed came from this office, stated it was a short course of oral medication, cannot remember the name of the medication or the condition    Advised for him to try to find out name of medication and to call back to let us know to see if that is something we can help him with, he acknowledged understanding

## 2017-11-21 DIAGNOSIS — E55.9 VITAMIN D DEFICIENCY: ICD-10-CM

## 2017-11-21 DIAGNOSIS — D72.10 EOSINOPHILIA: ICD-10-CM

## 2017-11-21 DIAGNOSIS — R73.09 ELEVATED HEMOGLOBIN A1C: ICD-10-CM

## 2017-11-21 DIAGNOSIS — E78.00 PURE HYPERCHOLESTEROLEMIA: ICD-10-CM

## 2018-08-07 ENCOUNTER — OFFICE VISIT (OUTPATIENT)
Dept: FAMILY MEDICINE CLINIC | Facility: CLINIC | Age: 48
End: 2018-08-07

## 2018-08-07 VITALS
HEART RATE: 76 BPM | OXYGEN SATURATION: 97 % | HEIGHT: 74 IN | RESPIRATION RATE: 18 BRPM | BODY MASS INDEX: 26.05 KG/M2 | TEMPERATURE: 97.9 F | WEIGHT: 203 LBS | DIASTOLIC BLOOD PRESSURE: 79 MMHG | SYSTOLIC BLOOD PRESSURE: 114 MMHG

## 2018-08-07 DIAGNOSIS — E55.9 VITAMIN D DEFICIENCY: ICD-10-CM

## 2018-08-07 DIAGNOSIS — E78.00 PURE HYPERCHOLESTEROLEMIA: ICD-10-CM

## 2018-08-07 DIAGNOSIS — R73.03 PREDIABETES: ICD-10-CM

## 2018-08-07 DIAGNOSIS — F98.8 ATTENTION DEFICIT DISORDER, UNSPECIFIED HYPERACTIVITY PRESENCE: ICD-10-CM

## 2018-08-07 RX ORDER — DEXTROAMPHETAMINE SACCHARATE, AMPHETAMINE ASPARTATE, DEXTROAMPHETAMINE SULFATE AND AMPHETAMINE SULFATE 2.5; 2.5; 2.5; 2.5 MG/1; MG/1; MG/1; MG/1
10 TABLET ORAL 2 TIMES DAILY
Qty: 30 TAB | Refills: 0 | Status: SHIPPED | OUTPATIENT
Start: 2018-08-07 | End: 2018-10-26 | Stop reason: SDUPTHER

## 2018-08-07 NOTE — PROGRESS NOTES
Aracelis Joseph is a 56 y.o.  male presents today for office visit for follow up. Pt would also like to discuss Cholesterol problem etc. Pt is not fasting. Pt is in Room # 9      1. Have you been to the ER, urgent care clinic since your last visit? Hospitalized since your last visit? No    2. Have you seen or consulted any other health care providers outside of the [facility] since your last visit? Include any pap smears or colon screening. No    Upcoming Appts  none    Health Maintenance reviewed Dtap:pt declined Flu: pt declined    VORB: No orders of the defined types were placed in this encounter.   Ramila Rivera LPN

## 2018-08-07 NOTE — MR AVS SNAPSHOT
[address] [address] 
[phone] Patient: Braxton Moultrie MRN: [id number] Visit Information Date & Time Provider Department Dept. Phone Encounter #  
 8/7/2018  1:30 PM Jaylan Carcamo NP [facility] [phone] [id number] Upcoming Health Maintenance Date Due DTaP/Tdap/Td series (1 - Tdap) 11/7/2018* Influenza Age 5 to Adult 11/7/2018* *Topic was postponed. The date shown is not the original due date. Allergies as of 8/7/2018  Review Complete On: 8/7/2018 By: Jaylan Carcamo NP No Known Allergies Current Immunizations  Reviewed on 9/20/2016 No immunizations on file. Not reviewed this visit You Were Diagnosed With   
  
 Codes Comments Vitamin D deficiency    -  Primary ICD-10-CM: E55.9 ICD-9-CM: 268.9 Attention deficit disorder, unspecified hyperactivity presence     ICD-10-CM: F98.8 ICD-9-CM: 314.00 Prediabetes     ICD-10-CM: R73.03 
ICD-9-CM: 790.29 Pure hypercholesterolemia     ICD-10-CM: E78.00 ICD-9-CM: 272.0 Vitals BP Pulse Temp Resp Height(growth percentile) Weight(growth percentile) 114/79 (BP 1 Location: Right arm, BP Patient Position: Sitting) 76 97.9 °F (36.6 °C) (Oral) 18 6' 2\" (1.88 m) 203 lb (92.1 kg) SpO2 BMI Smoking Status 97% 26.06 kg/m2 Former Smoker Vitals History BMI and BSA Data Body Mass Index Body Surface Area 26.06 kg/m 2 2.19 m 2 Preferred Pharmacy Pharmacy Name Phone [id number] [place], [address] [phone] Your Updated Medication List  
  
   
This list is accurate as of 8/7/18  2:55 PM.  Always use your most recent med list.  
  
  
  
  
 albuterol 90 mcg/actuation inhaler Commonly known as:  PROVENTIL HFA, VENTOLIN HFA, PROAIR HFA  
 Take 2 Puffs by inhalation every four (4) hours as needed for Wheezing. dextroamphetamine-amphetamine 10 mg tablet Commonly known as:  ADDERALL Take 1 Tab (10 mg total) by mouth two (2) times a day. Max Daily Amount: 20 mg Selenium Sulfide 2.25 % topical foam  
Apply  to affected area every Monday and Thursday. triamcinolone acetonide 0.1 % ointment Commonly known as:  KENALOG Apply  to affected area two (2) times a day. use thin layer Prescriptions Printed Refills  
 dextroamphetamine-amphetamine (ADDERALL) 10 mg tablet 0 Sig: Take 1 Tab (10 mg total) by mouth two (2) times a day. Max Daily Amount: 20 mg  
 Class: Print Route: Oral  
  
To-Do List   
 08/07/2018 Lab:  HEMOGLOBIN A1C W/O EAG   
  
 08/07/2018 Lab:  LIPID PANEL   
  
 08/07/2018 Lab:  VITAMIN D, 25 HYDROXY Patient Instructions Prediabetes: Care Instructions Your Care Instructions Prediabetes is a warning sign that you are at risk for getting type 2 diabetes. It means that your blood sugar is higher than it should be. The food you eat turns into sugar, which your body uses for energy. Normally, an organ called the pancreas makes insulin, which allows the sugar in your blood to get into your body's cells. But when your body can't use insulin the right way, the sugar doesn't move into cells. It stays in your blood instead. This is called insulin resistance. The buildup of sugar in the blood causes prediabetes. The good news is that lifestyle changes may help you get your blood sugar back to normal and help you avoid or delay diabetes. Follow-up care is a key part of your treatment and safety. Be sure to make and go to all appointments, and call your doctor if you are having problems. It's also a good idea to know your test results and keep a list of the medicines you take. How can you care for yourself at home? · Watch your weight. A healthy weight helps your body use insulin properly. · Limit the amount of calories, sweets, and unhealthy fat you eat. Ask your doctor if you should see a dietitian. A registered dietitian can help you create meal plans that fit your lifestyle. · Get at least 30 minutes of exercise on most days of the week. Exercise helps control your blood sugar. It also helps you maintain a healthy weight. Walking is a good choice. You also may want to do other activities, such as running, swimming, cycling, or playing tennis or team sports. · Do not smoke. Smoking can make prediabetes worse. If you need help quitting, talk to your doctor about stop-smoking programs and medicines. These can increase your chances of quitting for good. · If your doctor prescribed medicines, take them exactly as prescribed. Call your doctor if you think you are having a problem with your medicine. You will get more details on the specific medicines your doctor prescribes. When should you call for help? Watch closely for changes in your health, and be sure to contact your doctor if: 
  · You have any symptoms of diabetes. These may include: ¨ Being thirsty more often. ¨ Urinating more. ¨ Being hungrier. ¨ Losing weight. ¨ Being very tired. ¨ Having blurry vision.  
  · You have a wound that will not heal.  
  · You have an infection that will not go away.  
  · You have problems with your blood pressure.  
  · You want more information about diabetes and how you can keep from getting it. Where can you learn more? Go to http://ramon-josue.info/. Enter I222 in the search box to learn more about \"Prediabetes: Care Instructions. \" Current as of: December 7, 2017 Content Version: 11.7 © 7446-5566 [employer].  Care instructions adapted under license by [employer] (which disclaims liability or warranty for this information). If you have questions about a medical condition or this instruction, always ask your healthcare professional. [address] any warranty or liability for your use of this information. Learning About Vitamin D Why is it important to get enough vitamin D? Your body needs vitamin D to absorb calcium. Calcium keeps your bones and muscles, including your heart, healthy and strong. If your muscles don't get enough calcium, they can cramp, hurt, or feel weak. You may have long-term (chronic) muscle aches and pains. If you don't get enough vitamin D throughout life, you have an increased chance of having thin and brittle bones (osteoporosis) in your later years. Children who don't get enough vitamin D may not grow as much as others their age. They also have a chance of getting a rare disease called rickets. It causes weak bones. Vitamin D and calcium are added to many foods. And your body uses sunshine to make its own vitamin D. How much vitamin D do you need? The [employer] recommends that people ages 3 through 79 get 600 IU (international units) every day. Adults 71 and older need 800 IU every day. Blood tests for vitamin D can check your vitamin D level. But there is no standard normal range used by all laboratories. The [employer] recommends a blood level of 20 ng/mL of vitamin D for healthy bones. And most people in the [place] and [facility]) meet this goal. 
How can you get more vitamin D? Foods that contain vitamin D include: 
· [place], tuna, and mackerel. These are some of the best foods to eat when you need to get more vitamin D. 
· Cheese, egg yolks, and beef liver. These foods have vitamin D in small amounts. · Milk, soy drinks, orange juice, yogurt, margarine, and some kinds of cereal have vitamin D added to them. Some people don't make vitamin D as well as others.  They may have to take extra care in getting enough vitamin D. 
 Things that reduce how much vitamin D your body makes include: · Dark skin, such as many  Americans have. · Age, especially if you are older than 72. · Digestive problems, such as Crohn's or celiac disease. · Liver and kidney disease. Some people who do not get enough vitamin D may need supplements. Are there any risks from taking vitamin D? 
· Too much vitamin D: 
¨ Can damage your kidneys. ¨ Can cause nausea and vomiting, constipation, and weakness. ¨ Raises the amount of calcium in your blood. If this happens, you can get confused or have an irregular heart rhythm. · Vitamin D may interact with other medicines. Tell your doctor about all of the medicines you take, including over-the-counter drugs, herbs, and pills. Tell your doctor about all of your current medical problems. Where can you learn more? Go to http://ramon-josue.info/. Enter [id number] in the search box to learn more about \"Learning About Vitamin D.\" 
Current as of: May 12, 2017 Content Version: 11.7 © 0297-5293 [email or web address].com. Care instructions adapted under license by [employer] (which disclaims liability or warranty for this information). If you have questions about a medical condition or this instruction, always ask your healthcare professional. [address] any warranty or liability for your use of this information. Low HDL Cholesterol: After Your Visit Your Care Instructions Cholesterol is a type of fat in your blood. It is needed for many body functions, such as making new cells. Cholesterol is made by your body and also comes from food you eat. HDL (high-density lipoprotein) is the \"good\" cholesterol. Low levels of HDL can increase your risk of having a heart attack or stroke. It is best if your HDL level is at least 40 (measured in milligrams per deciliter, or mg/dL). Low HDL usually is caused by a poor diet and a lack of exercise.  You may raise your HDL level by eating less animal fat and more vegetables. Getting regular exercise can also help. But for some people, low HDL runs in the family. If changes in diet and exercise do not raise your HDL level, your doctor may recommend medicine. Follow-up care is a key part of your treatment and safety. Be sure to make and go to all appointments, and call your doctor if you are having problems. Its also a good idea to know your test results and keep a list of the medicines you take. How can you care for yourself at home? · Eat a healthy diet. Read food labels and try to avoid saturated fat and trans fat. ¨ Limit the amount of fatty meat and milk products you eat. Choose low-fat meats, such as skinless chicken breasts, and low-fat or nonfat dairy products. ¨ Bake, broil, grill, or steam foods instead of frying them. Avoid fried foods. ¨ Eat foods with whole grains, such as whole wheat bread, instead of white bread. Eat brown rice instead of white rice. ¨ Try not to eat liver and eggs. They contain a lot of \"bad\" cholesterol. Becky Dura with oils such as olive, canola, or peanut oil. ¨ Eat beans and peas for protein. · Try to lose weight if you are overweight. · Get regular exercise. Even mild regular exercise alone may increase your HDL level. Walking is a good choice. Bit by bit, increase the amount you walk every day. Try for at least 30 minutes on most days of the week. You also may want to swim, bike, or do other activities. · Stop smoking, which can lower your HDL level. If you need help quitting, talk to your doctor about stop-smoking programs and medicines. These can increase your chances of quitting for good. · Take your medicines exactly as prescribed. Call your doctor if you think you are having a problem with your medicine. When should you call for help? Call 911 anytime you think you may need emergency care. For example, call if: 
· You have signs of a stroke. These may include: ¨ Sudden numbness, paralysis, or weakness in your face, arm, or leg, especially on only one side of your body. ¨ New problems with walking or balance. ¨ Sudden vision changes. ¨ Drooling or slurred speech. ¨ New problems speaking or understanding simple statements, or feeling confused. ¨ A sudden, severe headache that is different from past headaches. · You have symptoms of a heart attack. These may include: ¨ Chest pain or pressure, or a strange feeling in the chest. 
¨ Sweating. ¨ Shortness of breath. ¨ Nausea or vomiting. ¨ Pain, pressure, or a strange feeling in the back, neck, jaw, or upper belly or in one or both shoulders or arms. ¨ Lightheadedness or sudden weakness. ¨ A fast or irregular heartbeat. After you call 911, the  may tell you to chew 1 adult-strength or 2 to 4 low-dose aspirin. Wait for an ambulance. Do not try to drive yourself. Watch closely for changes in your health, and be sure to contact your doctor if: 
· Your HDL level does not increase after making diet and exercise changes. · You are worried about your cholesterol level. · You do not get better as expected. Where can you learn more? Go to echoBase.be Enter L121 in the search box to learn more about \"Low HDL Cholesterol: After Your Visit. \"  
© 6451-5055 Healthwise, Incorporated. Care instructions adapted under license by [facility] (which disclaims liability or warranty for this information). This care instruction is for use with your licensed healthcare professional. If you have questions about a medical condition or this instruction, always ask your healthcare professional. [place] [address] any warranty or liability for your use of this information. Content Version: 4.7.100282; Last Revised: October 13, 2011 High Cholesterol: Care Instructions Your Care Instructions Cholesterol is a type of fat in your blood.  It is needed for many body functions, such as making new cells. Cholesterol is made by your body. It also comes from food you eat. High cholesterol means that you have too much of the fat in your blood. This raises your risk of a heart attack and stroke. LDL and HDL are part of your total cholesterol. LDL is the \"bad\" cholesterol. High LDL can raise your risk for heart disease, heart attack, and stroke. HDL is the \"good\" cholesterol. It helps clear bad cholesterol from the body. High HDL is linked with a lower risk of heart disease, heart attack, and stroke. Your cholesterol levels help your doctor find out your risk for having a heart attack or stroke. You and your doctor can talk about whether you need to lower your risk and what treatment is best for you. A heart-healthy lifestyle along with medicines can help lower your cholesterol and your risk. The way you choose to lower your risk will depend on how high your risk is for heart attack and stroke. It will also depend on how you feel about taking medicines. Follow-up care is a key part of your treatment and safety. Be sure to make and go to all appointments, and call your doctor if you are having problems. It's also a good idea to know your test results and keep a list of the medicines you take. How can you care for yourself at home? · Eat a variety of foods every day. Good choices include fruits, vegetables, whole grains (like oatmeal), dried beans and peas, nuts and seeds, soy products (like tofu), and fat-free or low-fat dairy products. · Replace butter, margarine, and hydrogenated or partially hydrogenated oils with olive and canola oils. (Canola oil margarine without trans fat is fine.) · Replace red meat with fish, poultry, and soy protein (like tofu). · Limit processed and packaged foods like chips, crackers, and cookies. · Bake, broil, or steam foods. Don't hope them. · Be physically active.  Get at least 30 minutes of exercise on most days of the week. Walking is a good choice. You also may want to do other activities, such as running, swimming, cycling, or playing tennis or team sports. · Stay at a healthy weight or lose weight by making the changes in eating and physical activity listed above. Losing just a small amount of weight, even 5 to 10 pounds, can reduce your risk for having a heart attack or stroke. · Do not smoke. When should you call for help? Watch closely for changes in your health, and be sure to contact your doctor if: 
  · You need help making lifestyle changes.  
  · You have questions about your medicine. Where can you learn more? Go to http://ramonHappigo.comjosue.info/. Enter D768 in the search box to learn more about \"High Cholesterol: Care Instructions. \" Current as of: May 10, 2017 Content Version: 11.7 © 2192-9914 [employer]. Care instructions adapted under license by [employer] (which disclaims liability or warranty for this information). If you have questions about a medical condition or this instruction, always ask your healthcare professional. [address] any warranty or liability for your use of this information. Introducing [facility] [place] [facility]! [facility] introduces [employer] patient portal. Now you can access parts of your medical record, email your doctor's office, and request medication refills online. 1. In your internet browser, go to [email or web address] 2. Click on the First Time User? Click Here link in the Sign In box. You will see the New Member Sign Up page. 3. Enter your [employer] Access Code exactly as it appears below. You will not need to use this code after youve completed the sign-up process. If you do not sign up before the expiration date, you must request a new code. · [employer] Access Code: [email or web address] Expires: 11/5/2018  2:48 PM 
 
 4. Enter the last four digits of your Social Security Number (xxxx) and Date of Birth (mm/dd/yyyy) as indicated and click Submit. You will be taken to the next sign-up page. 5. Create a [employer] ID. This will be your [employer] login ID and cannot be changed, so think of one that is secure and easy to remember. 6. Create a [employer] password. You can change your password at any time. 7. Enter your Password Reset Question and Answer. This can be used at a later time if you forget your password. 8. Enter your e-mail address. You will receive e-mail notification when new information is available in [address] 9. Click Sign Up. You can now view and download portions of your medical record. 10. Click the Download Summary menu link to download a portable copy of your medical information. If you have questions, please visit the Frequently Asked Questions section of the [employer] website. Remember, [employer] is NOT to be used for urgent needs. For medical emergencies, dial 911. Now available from your iPhone and Android! Please provide this summary of care documentation to your next provider. If you have any questions after today's visit, please call [phone].

## 2018-08-07 NOTE — PATIENT INSTRUCTIONS
Prediabetes: Care Instructions  Your Care Instructions    Prediabetes is a warning sign that you are at risk for getting type 2 diabetes. It means that your blood sugar is higher than it should be. The food you eat turns into sugar, which your body uses for energy. Normally, an organ called the pancreas makes insulin, which allows the sugar in your blood to get into your body's cells. But when your body can't use insulin the right way, the sugar doesn't move into cells. It stays in your blood instead. This is called insulin resistance. The buildup of sugar in the blood causes prediabetes. The good news is that lifestyle changes may help you get your blood sugar back to normal and help you avoid or delay diabetes. Follow-up care is a key part of your treatment and safety. Be sure to make and go to all appointments, and call your doctor if you are having problems. It's also a good idea to know your test results and keep a list of the medicines you take. How can you care for yourself at home? · Watch your weight. A healthy weight helps your body use insulin properly. · Limit the amount of calories, sweets, and unhealthy fat you eat. Ask your doctor if you should see a dietitian. A registered dietitian can help you create meal plans that fit your lifestyle. · Get at least 30 minutes of exercise on most days of the week. Exercise helps control your blood sugar. It also helps you maintain a healthy weight. Walking is a good choice. You also may want to do other activities, such as running, swimming, cycling, or playing tennis or team sports. · Do not smoke. Smoking can make prediabetes worse. If you need help quitting, talk to your doctor about stop-smoking programs and medicines. These can increase your chances of quitting for good. · If your doctor prescribed medicines, take them exactly as prescribed. Call your doctor if you think you are having a problem with your medicine.  You will get more details on the specific medicines your doctor prescribes. When should you call for help? Watch closely for changes in your health, and be sure to contact your doctor if:    · You have any symptoms of diabetes. These may include:  ¨ Being thirsty more often. ¨ Urinating more. ¨ Being hungrier. ¨ Losing weight. ¨ Being very tired. ¨ Having blurry vision.     · You have a wound that will not heal.     · You have an infection that will not go away.     · You have problems with your blood pressure.     · You want more information about diabetes and how you can keep from getting it. Where can you learn more? Go to http://ramon-josue.info/. Enter I222 in the search box to learn more about \"Prediabetes: Care Instructions. \"  Current as of: December 7, 2017  Content Version: 11.7  © 0571-4310 [employer]. Care instructions adapted under license by [employer] (which disclaims liability or warranty for this information). If you have questions about a medical condition or this instruction, always ask your healthcare professional. [place] [address] any warranty or liability for your use of this information. Learning About Vitamin D  Why is it important to get enough vitamin D? Your body needs vitamin D to absorb calcium. Calcium keeps your bones and muscles, including your heart, healthy and strong. If your muscles don't get enough calcium, they can cramp, hurt, or feel weak. You may have long-term (chronic) muscle aches and pains. If you don't get enough vitamin D throughout life, you have an increased chance of having thin and brittle bones (osteoporosis) in your later years. Children who don't get enough vitamin D may not grow as much as others their age. They also have a chance of getting a rare disease called rickets. It causes weak bones. Vitamin D and calcium are added to many foods. And your body uses sunshine to make its own vitamin D.   How much vitamin D do you need? The [place] of Medicine recommends that people ages 3 through 79 get 600 IU (international units) every day. Adults 71 and older need 800 IU every day. Blood tests for vitamin D can check your vitamin D level. But there is no standard normal range used by all laboratories. The [place] of [employer] recommends a blood level of 20 ng/mL of vitamin D for healthy bones. And most people in the [place] and [facility]) meet this goal.  How can you get more vitamin D? Foods that contain vitamin D include:  · [place], tuna, and mackerel. These are some of the best foods to eat when you need to get more vitamin D.  · Cheese, egg yolks, and beef liver. These foods have vitamin D in small amounts. · Milk, soy drinks, orange juice, yogurt, margarine, and some kinds of cereal have vitamin D added to them. Some people don't make vitamin D as well as others. They may have to take extra care in getting enough vitamin D. Things that reduce how much vitamin D your body makes include:  · Dark skin, such as many  Americans have. · Age, especially if you are older than 72. · Digestive problems, such as Crohn's or celiac disease. · Liver and kidney disease. Some people who do not get enough vitamin D may need supplements. Are there any risks from taking vitamin D?  · Too much vitamin D:  ¨ Can damage your kidneys. ¨ Can cause nausea and vomiting, constipation, and weakness. ¨ Raises the amount of calcium in your blood. If this happens, you can get confused or have an irregular heart rhythm. · Vitamin D may interact with other medicines. Tell your doctor about all of the medicines you take, including over-the-counter drugs, herbs, and pills. Tell your doctor about all of your current medical problems. Where can you learn more? Go to http://ramon-josue.info/. Enter [id number] in the search box to learn more about \"Learning About Vitamin D.\"  Current as of:  May 12, 2017  Content Version: 11.7  © 5586-7349 [employer]. Care instructions adapted under license by [employer] (which disclaims liability or warranty for this information). If you have questions about a medical condition or this instruction, always ask your healthcare professional. [address] any warranty or liability for your use of this information. Low HDL Cholesterol: After Your Visit  Your Care Instructions  Cholesterol is a type of fat in your blood. It is needed for many body functions, such as making new cells. Cholesterol is made by your body and also comes from food you eat. HDL (high-density lipoprotein) is the \"good\" cholesterol. Low levels of HDL can increase your risk of having a heart attack or stroke. It is best if your HDL level is at least 40 (measured in milligrams per deciliter, or mg/dL). Low HDL usually is caused by a poor diet and a lack of exercise. You may raise your HDL level by eating less animal fat and more vegetables. Getting regular exercise can also help. But for some people, low HDL runs in the family. If changes in diet and exercise do not raise your HDL level, your doctor may recommend medicine. Follow-up care is a key part of your treatment and safety. Be sure to make and go to all appointments, and call your doctor if you are having problems. Its also a good idea to know your test results and keep a list of the medicines you take. How can you care for yourself at home? · Eat a healthy diet. Read food labels and try to avoid saturated fat and trans fat. ¨ Limit the amount of fatty meat and milk products you eat. Choose low-fat meats, such as skinless chicken breasts, and low-fat or nonfat dairy products. ¨ Bake, broil, grill, or steam foods instead of frying them. Avoid fried foods. ¨ Eat foods with whole grains, such as whole wheat bread, instead of white bread. Eat brown rice instead of white rice. ¨ Try not to eat liver and eggs.  They contain a lot of \"bad\" cholesterol. [place] with oils such as olive, canola, or peanut oil. ¨ Eat beans and peas for protein. · Try to lose weight if you are overweight. · Get regular exercise. Even mild regular exercise alone may increase your HDL level. Walking is a good choice. Bit by bit, increase the amount you walk every day. Try for at least 30 minutes on most days of the week. You also may want to swim, bike, or do other activities. · Stop smoking, which can lower your HDL level. If you need help quitting, talk to your doctor about stop-smoking programs and medicines. These can increase your chances of quitting for good. · Take your medicines exactly as prescribed. Call your doctor if you think you are having a problem with your medicine. When should you call for help? Call 911 anytime you think you may need emergency care. For example, call if:  · You have signs of a stroke. These may include:  ¨ Sudden numbness, paralysis, or weakness in your face, arm, or leg, especially on only one side of your body. ¨ New problems with walking or balance. ¨ Sudden vision changes. ¨ Drooling or slurred speech. ¨ New problems speaking or understanding simple statements, or feeling confused. ¨ A sudden, severe headache that is different from past headaches. · You have symptoms of a heart attack. These may include:  ¨ Chest pain or pressure, or a strange feeling in the chest.  ¨ Sweating. ¨ Shortness of breath. ¨ Nausea or vomiting. ¨ Pain, pressure, or a strange feeling in the back, neck, jaw, or upper belly or in one or both shoulders or arms. ¨ Lightheadedness or sudden weakness. ¨ A fast or irregular heartbeat. After you call 911, the  may tell you to chew 1 adult-strength or 2 to 4 low-dose aspirin. Wait for an ambulance. Do not try to drive yourself.   Watch closely for changes in your health, and be sure to contact your doctor if:  · Your HDL level does not increase after making diet and exercise changes. · You are worried about your cholesterol level. · You do not get better as expected. Where can you learn more? Go to Graduateland.be  Enter L121 in the search box to learn more about \"Low HDL Cholesterol: After Your Visit. \"   © 7049-7459 Healthwise, Incorporated. Care instructions adapted under license by Deidre Edwards (which disclaims liability or warranty for this information). This care instruction is for use with your licensed healthcare professional. If you have questions about a medical condition or this instruction, always ask your healthcare professional. [address] any warranty or liability for your use of this information. Content Version: 7.2.624381; Last Revised: October 13, 2011                 High Cholesterol: Care Instructions  Your Care Instructions    Cholesterol is a type of fat in your blood. It is needed for many body functions, such as making new cells. Cholesterol is made by your body. It also comes from food you eat. High cholesterol means that you have too much of the fat in your blood. This raises your risk of a heart attack and stroke. LDL and HDL are part of your total cholesterol. LDL is the \"bad\" cholesterol. High LDL can raise your risk for heart disease, heart attack, and stroke. HDL is the \"good\" cholesterol. It helps clear bad cholesterol from the body. High HDL is linked with a lower risk of heart disease, heart attack, and stroke. Your cholesterol levels help your doctor find out your risk for having a heart attack or stroke. You and your doctor can talk about whether you need to lower your risk and what treatment is best for you. A heart-healthy lifestyle along with medicines can help lower your cholesterol and your risk. The way you choose to lower your risk will depend on how high your risk is for heart attack and stroke. It will also depend on how you feel about taking medicines.   Follow-up care is a key part of your treatment and safety. Be sure to make and go to all appointments, and call your doctor if you are having problems. It's also a good idea to know your test results and keep a list of the medicines you take. How can you care for yourself at home? · Eat a variety of foods every day. Good choices include fruits, vegetables, whole grains (like oatmeal), dried beans and peas, nuts and seeds, soy products (like tofu), and fat-free or low-fat dairy products. · Replace butter, margarine, and hydrogenated or partially hydrogenated oils with olive and canola oils. (Canola oil margarine without trans fat is fine.)  · Replace red meat with fish, poultry, and soy protein (like tofu). · Limit processed and packaged foods like chips, crackers, and cookies. · Bake, broil, or steam foods. Don't hope them. · Be physically active. Get at least 30 minutes of exercise on most days of the week. Walking is a good choice. You also may want to do other activities, such as running, swimming, cycling, or playing tennis or team sports. · Stay at a healthy weight or lose weight by making the changes in eating and physical activity listed above. Losing just a small amount of weight, even 5 to 10 pounds, can reduce your risk for having a heart attack or stroke. · Do not smoke. When should you call for help? Watch closely for changes in your health, and be sure to contact your doctor if:    · You need help making lifestyle changes.     · You have questions about your medicine. Where can you learn more? Go to http://ramon-josue.info/. Enter Y139 in the search box to learn more about \"High Cholesterol: Care Instructions. \"  Current as of: May 10, 2017  Content Version: 11.7  © 9379-5969 [employer], [employer]. Care instructions adapted under license by [employer] (which disclaims liability or warranty for this information).  If you have questions about a medical condition or this instruction, always ask your healthcare professional. [place] [address] any warranty or liability for your use of this information.

## 2018-08-07 NOTE — PROGRESS NOTES
Progress Note  Today's Date:  2018   Patient:  Wendy Queen  Patient :  1970    Subjective:   Wendy Queen is a 56 y.o. male who presents for follow up. For ADD, prediabetes, vitamin d deficiency, HDL. Patient was last seen here  On 2017 by previous PCP. Patient states he is only here for medication refill for his ADD. He is requesting Adderall. Patient has two referrals for psych. States he did not follow up. Patient is unpleasant in exam room and asking why he was not informed that his Previous PCP had left the office. Patient states he lives in [place] [address], and he drove over two hours to be seen here for his medication refill. Patient informed that his visit is for follow up of his chronic medical conditions. Patient states he does not have prediabetes and the only medication he takes is adderall. His prescription for Adderall  eight months ago. Patient reports that he have no new complaints today. He declined labs. Current Outpatient Meds and Allergies     Current Outpatient Prescriptions on File Prior to Visit   Medication Sig Dispense Refill    dextroamphetamine-amphetamine (ADDERALL) 10 mg tablet Take 1 Tab (10 mg total) by mouth two (2) times a dayEarliest Fill Date: 17. Max Daily Amount: 20 mg 60 Tab 0    albuterol (PROVENTIL HFA, VENTOLIN HFA, PROAIR HFA) 90 mcg/actuation inhaler Take 2 Puffs by inhalation every four (4) hours as needed for Wheezing. 1 Inhaler 6    triamcinolone acetonide (KENALOG) 0.1 % ointment Apply  to affected area two (2) times a day. use thin layer 80 g 1    Selenium Sulfide 2.25 % topical foam Apply  to affected area every Monday and Thursday. 1 Can 3     No current facility-administered medications on file prior to visit. These medications have been reviewed and reconciled with the patient during today's visit.       No Known Allergies  Past Medical History:   Diagnosis Date    Eczema     Eosinophilia     [facility]     Interstitial lung disease [facility]     Pulmonary lesion     Sarcoidosis      Past Surgical History:   Procedure Laterality Date    HX HEENT      wisdom teeth     Social History     Social History    Marital status: SINGLE     Spouse name: N/A    Number of children: N/A    Years of education: N/A     Occupational History    Not on file. Social History Main Topics    Smoking status: Former Smoker     Packs/day: 0.25     Years: 16.00     Types: Cigarettes     Start date: 3/6/2000    Smokeless tobacco: Never Used    Alcohol use 0.0 oz/week     0 Standard drinks or equivalent per week      Comment: once a month    Drug use: No    Sexual activity: Yes     Partners: Female     Birth control/ protection: Condom     Other Topics Concern    Not on file     Social History Narrative       ROS:       Positive symptoms are BOLDED:    CONST:   Fatigue, weight change, appetite change  NEURO:   Headaches, vision changes, dizziness, loss of consciousness  CV:      Chest pain, palpitations, orthopnea, PND  PULM:             SOB, wheezing, cough, hemoptysis  GI:             Nausea, vomiting, abdominal pain, greasy stools, blood in stool,     diarrhea, constipation  :       Dysuria, hematuria, change in urine  MS:      Muscle/joint pain, joint swelling  SKIN:        Rashes, skin changes  ALLERGY: Seasonal allergies, itchy eyes  HEME: Easy bleeding/bruising      Objective:     VS:    Visit Vitals    /79 (BP 1 Location: Right arm, BP Patient Position: Sitting)    Pulse 76    Temp 97.9 °F (36.6 °C) (Oral)    Resp 18    Ht 6' 2\" (1.88 m)    Wt 203 lb (92.1 kg)    SpO2 97%    BMI 26.06 kg/m2       General:   Well-nourished, well-groomed, unpleasant, alert, in no acute distress.      Head:  Normocephalic, atraumatic, MMM, normal dentition  Ears:  External ears normaL, BilateralTMs normal,   Eyes:  EOMI, PERRL, wearing glasses  Nose:  External nares WNL  Neck:  Neck supple with normal ROM for age, no thyromegaly, No LAD  Throat: clear  Cardiovasc:   Regular rate and rhythm, no murmurs, no rubs, no gallops,   Pulmonary:   Clear breath sounds bilaterally, good air movement, no wheezing, no rales, no rhonchi, normal respiratory effort  Abdomen:   Abdomen soft, nontender, nondistended, NABS  Extremities:   No edema, no tenderness with palpation of calves, warm and well-perfused  Neuro:   Alert, conversant, appropriate, following commands, no focal deficits. Pertinent diagnostic procedures include:  No results found for this or any previous visit (from the past 24 hour(s)). No visits with results within 12 Month(s) from this visit. Latest known visit with results is:    Orders Only on 04/19/2017   Component Date Value Ref Range Status    WBC 04/17/2017 5.8  4.0 - 11.0 K/uL Final    RBC 04/17/2017 5.72  3.80 - 5.80 M/uL Final    HGB 04/17/2017 14.1  13.1 - 17.2 g/dL Final    HCT 04/17/2017 45.5  39.3 - 51.6 % Final    MCV 04/17/2017 80  80 - 95 fL Final    MCH 04/17/2017 25* 26 - 34 pg Final    MCHC 04/17/2017 31* 32 - 36 g/dL Final    RDW 04/17/2017 14.9  10.0 - 16.0 % Final    PLATELET 53/63/1413 886  140 - 440 K/uL Final    NEUTROPHILS 04/17/2017 48  40 - 75 % Final    Lymphocytes 04/17/2017 30  27 - 45 % Final    MONOCYTES 04/17/2017 15* 3 - 9 % Final    EOSINOPHILS 04/17/2017 6  0 - 6 % Final    BASOPHILS 04/17/2017 2  0 - 2 % Final    ABS. NEUTROPHILS 04/17/2017 2.8  1.8 - 7.7 K/uL Final    ABSOLUTE LYMPHOCYTE COUNT 04/17/2017 1.7  1.0 - 4.8 K/uL Final    ABS. MONOCYTES 04/17/2017 0.9  0.1 - 0.9 K/uL Final    ABS. EOSINOPHILS 04/17/2017 0.3  0.0 - 0.5 K/uL Final    ABS.  BASOPHILS 04/17/2017 0.1  0.0 - 0.2 K/uL Final    Glucose 04/17/2017 89  65 - 99 mg/dL Final    BUN 04/17/2017 14  6 - 22 mg/dL Final    Creatinine 04/17/2017 1.0  0.5 - 1.2 mg/dL Final    Sodium 04/17/2017 140  133 - 145 mmol/L Final    Potassium 04/17/2017 4.4  3.5 - 5.5 mmol/L Final  Chloride 04/17/2017 97* 98 - 110 mmol/L Final    CO2 04/17/2017 27  20 - 32 mmol/L Final    AST (SGOT) 04/17/2017 15  10 - 37 U/L Final    ALT (SGPT) 04/17/2017 16  5 - 40 U/L Final    Alk. phosphatase 04/17/2017 71  25 - 115 U/L Final    Bilirubin, total 04/17/2017 0.3  0.2 - 1.2 mg/dL Final    Calcium 04/17/2017 9.8  8.4 - 10.4 mg/dL Final    Protein, total 04/17/2017 7.0  6.4 - 8.3 g/dL Final    Albumin 04/17/2017 4.1  3.5 - 5.0 g/dL Final    A-G Ratio 04/17/2017 1.4  1.1 - 2.6 ratio Final    Globulin 04/17/2017 2.9  2.0 - 4.0 g/dL Final    Anion gap 04/17/2017 16.0  mmol/L Final    Comment: Test includes Albumin, Alkaline Phosphatase, ALT, AST, BUN, Calcium, CO2,  Chloride, Creatinine, Glucose, Potassium, Sodium, Total Bilirubin and Total  Protein. Estimated GFR results are reported in mL/min/1.73 sq.m. by the MDRD equation. This eGFR is validated for stable chronic renal failure patients. This   equation  is unreliable in acute illness or patients with normal renal function.  GFRAA 04/17/2017 >60.0  >60.0 Final    GFRNA 04/17/2017 >60.0  >60.0 Final    Triglyceride 04/17/2017 96  40 - 149 mg/dL Final    HDL Cholesterol 04/17/2017 44  40 - 59 mg/dL Final    Cholesterol, total 04/17/2017 204* 110 - 200 mg/dL Final    LDL, calculated 04/17/2017 141* 50 - 99 mg/dL Final    VLDL, calculated 04/17/2017 19  8 - 30 mg/dL Final    Comment: Test includes cholesterol, HDL cholesterol, triglycerides and LDL. Cholesterol Recommended NCEP guidelines in mg/dL:  Less than 200      Desirable  200 - 239          Borderline High  Greater than or  = to 240   High     ]  Assessment:       1. Prediabetes    2. Attention deficit disorder, unspecified hyperactivity presence    3. Vitamin D deficiency    4.  Pure hypercholesterolemia        Plan:       Orders Placed This Encounter    HEMOGLOBIN A1C W/O EAG     Standing Status:   Future     Number of Occurrences:   1     Standing Expiration Date:   8/8/2019  LIPID PANEL     Standing Status:   Future     Number of Occurrences:   1     Standing Expiration Date:   8/8/2019    VITAMIN D, 25 HYDROXY     Standing Status:   Future     Number of Occurrences:   1     Standing Expiration Date:   8/8/2019    dextroamphetamine-amphetamine (ADDERALL) 10 mg tablet     Sig: Take 1 Tab (10 mg total) by mouth two (2) times a day. Max Daily Amount: 20 mg     Dispense:  30 Tab     Refill:  0     Patient advised to follow up with Psych. Patient also advised to establish care with a [facility] in [facility] for his convenience. Healthy lifestyle has been encouraged including avoidance of tobacco, limiting or avoiding alcohol intake, heart healthy diet which is low in cholesterol and saturated fat and contains fresh fruits, vegetables and whole grains and fiber, regular exercise with goals of 20-30 minutes 3-5 days weekly and maintaining an optimal BMI. I have discussed the diagnosis with the patient and the intended plan as seen in the above orders. The patient has received an after-visit summary along with patient information handout. I have discussed medication side effects and warnings with the patient as well. Pt verbalized understanding.     Warden Weber NP-C  [facility]  [address] [place], [address]  Phone [phone]  Fax [phone]

## 2018-10-16 LAB
25(OH)D3 SERPL-MCNC: 22.1 NG/ML (ref 32–100)
AVG GLU, 10930: 128 MG/DL (ref 91–123)
CHOLEST SERPL-MCNC: 200 MG/DL (ref 110–200)
HBA1C MFR BLD HPLC: 6.1 % (ref 4.8–5.9)
HDLC SERPL-MCNC: 3.8 MG/DL (ref 0–5)
HDLC SERPL-MCNC: 52 MG/DL (ref 40–59)
LDLC SERPL CALC-MCNC: 127 MG/DL (ref 50–99)
TRIGL SERPL-MCNC: 104 MG/DL (ref 40–149)
VLDLC SERPL CALC-MCNC: 21 MG/DL (ref 8–30)

## 2018-10-17 NOTE — PROGRESS NOTES
Your LDL cholesterol decreased to 127, it was 141 one year ago, your total cholesterol decreased to 200 it was 204 last year. Continue lifestyle modifications. Your vitamin D is 22. Take OTC vitamin D supplement daily. You can take 1000 international units/day. Your hemoglobin A1C still shows prediabetes @ 6.1, however , it did drop from 6.4. Please follow up with your primary care provider for continued care.

## 2018-10-25 NOTE — PROGRESS NOTES
Called pt and left message. Call back number left and I myself or one of the other nurses will attempt to contact again. The call was to inform pt results    Letter was created coming to  signed prescrition .

## 2018-10-26 DIAGNOSIS — F98.8 ATTENTION DEFICIT DISORDER, UNSPECIFIED HYPERACTIVITY PRESENCE: ICD-10-CM

## 2018-10-26 RX ORDER — DEXTROAMPHETAMINE SACCHARATE, AMPHETAMINE ASPARTATE, DEXTROAMPHETAMINE SULFATE AND AMPHETAMINE SULFATE 2.5; 2.5; 2.5; 2.5 MG/1; MG/1; MG/1; MG/1
10 TABLET ORAL 2 TIMES DAILY
Qty: 30 TAB | Refills: 0 | Status: SHIPPED | OUTPATIENT
Start: 2018-10-26 | End: 2019-05-24 | Stop reason: SDUPTHER

## 2018-11-02 ENCOUNTER — OFFICE VISIT (OUTPATIENT)
Dept: INTERNAL MEDICINE CLINIC | Age: 48
End: 2018-11-02

## 2018-11-02 VITALS
BODY MASS INDEX: 28.71 KG/M2 | OXYGEN SATURATION: 94 % | RESPIRATION RATE: 18 BRPM | TEMPERATURE: 97.8 F | SYSTOLIC BLOOD PRESSURE: 117 MMHG | DIASTOLIC BLOOD PRESSURE: 79 MMHG | WEIGHT: 223.7 LBS | HEART RATE: 84 BPM | HEIGHT: 74 IN

## 2018-11-02 DIAGNOSIS — F32.4 MAJOR DEPRESSIVE DISORDER WITH SINGLE EPISODE, IN PARTIAL REMISSION (HCC): ICD-10-CM

## 2018-11-02 DIAGNOSIS — Z00.00 WELL ADULT EXAM: Primary | ICD-10-CM

## 2018-11-02 DIAGNOSIS — L30.9 ECZEMA, UNSPECIFIED TYPE: ICD-10-CM

## 2018-11-02 DIAGNOSIS — E78.00 PURE HYPERCHOLESTEROLEMIA: ICD-10-CM

## 2018-11-02 DIAGNOSIS — R91.1 PULMONARY NODULE: ICD-10-CM

## 2018-11-02 DIAGNOSIS — R73.03 PREDIABETES: ICD-10-CM

## 2018-11-02 DIAGNOSIS — F17.200 SMOKER: ICD-10-CM

## 2018-11-02 DIAGNOSIS — D86.9 SARCOIDOSIS: ICD-10-CM

## 2018-11-02 DIAGNOSIS — M72.2 PLANTAR FASCIITIS, BILATERAL: ICD-10-CM

## 2018-11-02 DIAGNOSIS — L30.9 DERMATITIS: ICD-10-CM

## 2018-11-02 DIAGNOSIS — E55.9 VITAMIN D DEFICIENCY: ICD-10-CM

## 2018-11-02 RX ORDER — PREDNISONE 20 MG/1
60 TABLET ORAL
COMMUNITY
Start: 2018-07-12 | End: 2019-05-24 | Stop reason: ALTCHOICE

## 2018-11-02 RX ORDER — TRIAMCINOLONE ACETONIDE 1 MG/G
OINTMENT TOPICAL 2 TIMES DAILY
Qty: 80 G | Refills: 11 | Status: SHIPPED | OUTPATIENT
Start: 2018-11-02 | End: 2020-05-23

## 2018-11-02 NOTE — LETTER
11/5/2018 6:27 PM 
 
Mr. Chidi Gracia [facility] Dear Ellie Lo, Your lab studies are slightly abnormal. No medication changes are needed at this time. We will repeat the studies at your next visit. Please find your most recent results below Resulted Orders URINALYSIS W/ RFLX MICROSCOPIC Result Value Ref Range Specific Gravity 1.029 1.005 - 1.030  
 pH (UA) 7.5 5.0 - 7.5 Color Yellow Yellow Appearance Clear Clear Leukocyte Esterase Negative Negative Protein Trace Negative/Trace Glucose Negative Negative Ketone Negative Negative Blood Negative Negative Bilirubin Negative Negative Urobilinogen 1.0 0.2 - 1.0 mg/dL Nitrites Positive (A) Negative Microscopic Examination See additional order Comment:  
   Microscopic was indicated and was performed. Narrative Performed at:  [address]  [id number] : Yasir Perez MD, Phone:  [phone] CBC WITH AUTOMATED DIFF Result Value Ref Range WBC 7.4 3.4 - 10.8 x10E3/uL  
 RBC 5.58 4.14 - 5.80 x10E6/uL HGB 14.3 13.0 - 17.7 g/dL HCT 44.7 37.5 - 51.0 % MCV 80 79 - 97 fL  
 MCH 25.6 (L) 26.6 - 33.0 pg  
 MCHC 32.0 31.5 - 35.7 g/dL  
 RDW 14.8 12.3 - 15.4 % PLATELET 991 584 - 569 x10E3/uL NEUTROPHILS 71 Not Estab. % Lymphocytes 21 Not Estab. % MONOCYTES 6 Not Estab. % EOSINOPHILS 1 Not Estab. % BASOPHILS 1 Not Estab. %  
 ABS. NEUTROPHILS 5.3 1.4 - 7.0 x10E3/uL Abs Lymphocytes 1.6 0.7 - 3.1 x10E3/uL  
 ABS. MONOCYTES 0.4 0.1 - 0.9 x10E3/uL  
 ABS. EOSINOPHILS 0.1 0.0 - 0.4 x10E3/uL  
 ABS. BASOPHILS 0.0 0.0 - 0.2 x10E3/uL IMMATURE GRANULOCYTES 0 Not Estab. %  
 ABS. IMM. GRANS. 0.0 0.0 - 0.1 x10E3/uL Narrative Performed at:  [address]  [id number] : Yasir Perez MD, Phone:  [phone] METABOLIC PANEL, COMPREHENSIVE  
 Result Value Ref Range Glucose 93 65 - 99 mg/dL BUN 9 6 - 24 mg/dL Creatinine 1.11 0.76 - 1.27 mg/dL GFR est non-AA 79 >59 mL/min/1.73 GFR est AA 92 >59 mL/min/1.73  
 BUN/Creatinine ratio 8 (L) 9 - 20 Sodium 140 134 - 144 mmol/L Potassium 4.5 3.5 - 5.2 mmol/L Chloride 99 96 - 106 mmol/L  
 CO2 27 20 - 29 mmol/L Calcium 10.0 8.7 - 10.2 mg/dL Protein, total 6.8 6.0 - 8.5 g/dL Albumin 4.3 3.5 - 5.5 g/dL GLOBULIN, TOTAL 2.5 1.5 - 4.5 g/dL A-G Ratio 1.7 1.2 - 2.2 Bilirubin, total 0.3 0.0 - 1.2 mg/dL Alk. phosphatase 69 39 - 117 IU/L  
 AST (SGOT) 24 0 - 40 IU/L  
 ALT (SGPT) 27 0 - 44 IU/L Narrative Performed at:  [address]  [id number] : Isra Rodas MD, Phone:  [phone] LIPID PANEL Result Value Ref Range Cholesterol, total 196 100 - 199 mg/dL Triglyceride 118 0 - 149 mg/dL HDL Cholesterol 53 >39 mg/dL VLDL, calculated 24 5 - 40 mg/dL LDL, calculated 119 (H) 0 - 99 mg/dL Narrative Performed at:  [address]  [id number] : Isra Rodas MD, Phone:  [phone] PSA, DIAGNOSTIC (PROSTATE SPECIFIC AG) Result Value Ref Range Prostate Specific Ag 1.4 0.0 - 4.0 ng/mL Comment:  
   Roche ECLIA methodology. According to the American Urological Association, Serum PSA should 
decrease and remain at undetectable levels after radical 
prostatectomy. The AUA defines biochemical recurrence as an initial 
PSA value 0.2 ng/mL or greater followed by a subsequent confirmatory PSA value 0.2 ng/mL or greater. Values obtained with different assay methods or kits cannot be used 
interchangeably. Results cannot be interpreted as absolute evidence 
of the presence or absence of malignant disease. Narrative Performed at:  [address]  [id number] : Erna Tamayo MD, Phone:  [phone] HEMOGLOBIN A1C WITH EAG Result Value Ref Range Hemoglobin A1c 6.1 (H) 4.8 - 5.6 % Comment:  
            Prediabetes: 5.7 - 6.4 Diabetes: >6.4 Glycemic control for adults with diabetes: <7.0 Estimated average glucose 128 mg/dL Narrative Performed at:  [address]  [id number] : Erna Tamayo MD, Phone:  [phone] ANGIOTENSIN CONVERTING ENZYME Result Value Ref Range Angiotensin Converting Enzyme (ACE) 39 14 - 82 U/L Narrative Performed at:  [address]  [id number] : Erna Tamayo MD, Phone:  [phone] MICROSCOPIC EXAMINATION Result Value Ref Range WBC 0-5 0 - 5 /hpf  
 RBC 0-2 0 - 2 /hpf Epithelial cells 0-10 0 - 10 /hpf Casts None seen None seen /lpf Mucus Present Not Estab. Bacteria Few None seen/Few Narrative Performed at:  [address]  [id number] : Erna Tamayo MD, Phone:  [phone] Sophie Cuellar MD, FACP, CMD

## 2018-11-02 NOTE — PATIENT INSTRUCTIONS
Plantar Fasciitis: Care Instructions  Your Care Instructions    Plantar fasciitis is pain and inflammation of the plantar fascia, the tissue at the bottom of your foot that connects the heel bone to the toes. The plantar fascia also supports the arch. If you strain the plantar fascia, it can develop small tears and cause heel pain when you stand or walk. Plantar fasciitis can be caused by running or other sports. It also may occur in people who are overweight or who have high arches or flat feet. You may get plantar fasciitis if you walk or stand for long periods, or have a tight Achilles tendon or calf muscles. You can improve your foot pain with rest and other care at home. It might take a few weeks to a few months for your foot to heal completely. Follow-up care is a key part of your treatment and safety. Be sure to make and go to all appointments, and call your doctor if you are having problems. It's also a good idea to know your test results and keep a list of the medicines you take. How can you care for yourself at home? · Rest your feet often. Reduce your activity to a level that lets you avoid pain. If possible, do not run or walk on hard surfaces. · Take pain medicines exactly as directed. ? If the doctor gave you a prescription medicine for pain, take it as prescribed. ? If you are not taking a prescription pain medicine, take an over-the-counter anti-inflammatory medicine for pain and swelling, such as ibuprofen (Advil, Motrin) or naproxen (Aleve). Read and follow all instructions on the label. · Use ice massage to help with pain and swelling. You can use an ice cube or an ice cup several times a day. To make an ice cup, fill a paper cup with water and freeze it. Cut off the top of the cup until a half-inch of ice shows. Hold onto the remaining paper to use the cup. Rub the ice in small circles over the area for 5 to 7 minutes.   · Contrast baths, which alternate hot and cold water, can also help reduce swelling. But because heat alone may make pain and swelling worse, end a contrast bath with a soak in cold water. · Wear a night splint if your doctor suggests it. A night splint holds your foot with the toes pointed up and the foot and ankle at a 90-degree angle. This position gives the bottom of your foot a constant, gentle stretch. · Do simple exercises such as calf stretches and towel stretches 2 to 3 times each day, especially when you first get up in the morning. These can help the plantar fascia become more flexible. They also make the muscles that support your arch stronger. Hold these stretches for 15 to 30 seconds per stretch. Repeat 2 to 4 times. ? Stand about 1 foot from a wall. Place the palms of both hands against the wall at chest level. Lean forward against the wall, keeping one leg with the knee straight and heel on the ground while bending the knee of the other leg.  ? Sit down on the floor or a mat with your feet stretched in front of you. Roll up a towel lengthwise, and loop it over the ball of your foot. Holding the towel at both ends, gently pull the towel toward you to stretch your foot. · Wear shoes with good arch support. Athletic shoes or shoes with a well-cushioned sole are good choices. · Try heel cups or shoe inserts (orthotics) to help cushion your heel. You can buy these at many shoe stores. · Put on your shoes as soon as you get out of bed. Going barefoot or wearing slippers may make your pain worse. · Reach and stay at a good weight for your height. This puts less strain on your feet. When should you call for help? Call your doctor now or seek immediate medical care if:    · You have heel pain with fever, redness, or warmth in your heel.     · You cannot put weight on the sore foot.    Watch closely for changes in your health, and be sure to contact your doctor if:    · You have numbness or tingling in your heel.     · Your heel pain lasts more than 2 weeks. Where can you learn more? Go to http://ramon-josue.info/. [employer] in the search box to learn more about \"Plantar Fasciitis: Care Instructions. \"  Current as of: November 29, 2017  Content Version: 11.8  © 4091-8665 [employer]. Care instructions adapted under license by [employer] (which disclaims liability or warranty for this information). If you have questions about a medical condition or this instruction, always ask your healthcare professional. [address] any warranty or liability for your use of this information. Plantar Fasciitis: Exercises  Your Care Instructions  Here are some examples of typical rehabilitation exercises for your condition. Start each exercise slowly. Ease off the exercise if you start to have pain. Your doctor or physical therapist will tell you when you can start these exercises and which ones will work best for you. How to do the exercises  Towel stretch    1. Sit with your legs extended and knees straight. 2. Place a towel around your foot just under the toes. 3. Hold each end of the towel in each hand, with your hands above your knees. 4. Pull back with the towel so that your foot stretches toward you. 5. Hold the position for at least 15 to 30 seconds. 6. Repeat 2 to 4 times a session, up to 5 sessions a day. Calf stretch    1. Stand facing a wall with your hands on the wall at about eye level. Put the leg you want to stretch about a step behind your other leg. 2. Keeping your back heel on the floor, bend your front knee until you feel a stretch in the back leg. 3. Hold the stretch for 15 to 30 seconds. Repeat 2 to 4 times. Plantar fascia and calf stretch    1. Stand on a step as shown above. Be sure to hold on to the banister. 2. Slowly let your heels down over the edge of the step as you relax your calf muscles.  You should feel a gentle stretch across the bottom of your foot and up the back of your leg to your knee. 3. Hold the stretch about 15 to 30 seconds, and then tighten your calf muscle a little to bring your heel back up to the level of the step. Repeat 2 to 4 times. Towel curls    1. While sitting, place your foot on a towel on the floor and scrunch the towel toward you with your toes. 2. Then, also using your toes, push the towel away from you. Dickens pickups    1. Put marbles on the floor next to a cup.  2. Using your toes, try to lift the marbles up from the floor and put them in the cup. Follow-up care is a key part of your treatment and safety. Be sure to make and go to all appointments, and call your doctor if you are having problems. It's also a good idea to know your test results and keep a list of the medicines you take. Where can you learn more? Go to http://ramon-josue.info/. Gian Jones in the search box to learn more about \"Plantar Fasciitis: Exercises. \"  Current as of: November 29, 2017  Content Version: 11.8  © 9297-4396 [employer]. Care instructions adapted under license by [employer] (which disclaims liability or warranty for this information). If you have questions about a medical condition or this instruction, always ask your healthcare professional. [address] any warranty or liability for your use of this information.

## 2018-11-02 NOTE — LETTER
11/5/2018 6:25 PM 
 
Mr. Parker Cheung [facility] Dear George Dueñas, Your lab studies are slightly abnormal. No medication changes are needed at this time. We will repeat the studies at your next visit. Please find your most recent results below Resulted Orders URINALYSIS W/ RFLX MICROSCOPIC Result Value Ref Range Specific Gravity 1.029 1.005 - 1.030  
 pH (UA) 7.5 5.0 - 7.5 Color Yellow Yellow Appearance Clear Clear Leukocyte Esterase Negative Negative Protein Trace Negative/Trace Glucose Negative Negative Ketone Negative Negative Blood Negative Negative Bilirubin Negative Negative Urobilinogen 1.0 0.2 - 1.0 mg/dL Nitrites Positive (A) Negative Microscopic Examination See additional order Comment:  
   Microscopic was indicated and was performed. Narrative Performed at:  [address]  [id number] : Gerhardt Se MD, Phone:  [phone] CBC WITH AUTOMATED DIFF Result Value Ref Range WBC 7.4 3.4 - 10.8 x10E3/uL  
 RBC 5.58 4.14 - 5.80 x10E6/uL HGB 14.3 13.0 - 17.7 g/dL HCT 44.7 37.5 - 51.0 % MCV 80 79 - 97 fL  
 MCH 25.6 (L) 26.6 - 33.0 pg  
 MCHC 32.0 31.5 - 35.7 g/dL  
 RDW 14.8 12.3 - 15.4 % PLATELET 031 134 - 767 x10E3/uL NEUTROPHILS 71 Not Estab. % Lymphocytes 21 Not Estab. % MONOCYTES 6 Not Estab. % EOSINOPHILS 1 Not Estab. % BASOPHILS 1 Not Estab. %  
 ABS. NEUTROPHILS 5.3 1.4 - 7.0 x10E3/uL Abs Lymphocytes 1.6 0.7 - 3.1 x10E3/uL  
 ABS. MONOCYTES 0.4 0.1 - 0.9 x10E3/uL  
 ABS. EOSINOPHILS 0.1 0.0 - 0.4 x10E3/uL  
 ABS. BASOPHILS 0.0 0.0 - 0.2 x10E3/uL IMMATURE GRANULOCYTES 0 Not Estab. %  
 ABS. IMM. GRANS. 0.0 0.0 - 0.1 x10E3/uL Narrative Performed at:  [address]  [id number] : Gerhardt Se MD, Phone:  [phone] METABOLIC PANEL, COMPREHENSIVE  
 Result Value Ref Range Glucose 93 65 - 99 mg/dL BUN 9 6 - 24 mg/dL Creatinine 1.11 0.76 - 1.27 mg/dL GFR est non-AA 79 >59 mL/min/1.73 GFR est AA 92 >59 mL/min/1.73  
 BUN/Creatinine ratio 8 (L) 9 - 20 Sodium 140 134 - 144 mmol/L Potassium 4.5 3.5 - 5.2 mmol/L Chloride 99 96 - 106 mmol/L  
 CO2 27 20 - 29 mmol/L Calcium 10.0 8.7 - 10.2 mg/dL Protein, total 6.8 6.0 - 8.5 g/dL Albumin 4.3 3.5 - 5.5 g/dL GLOBULIN, TOTAL 2.5 1.5 - 4.5 g/dL A-G Ratio 1.7 1.2 - 2.2 Bilirubin, total 0.3 0.0 - 1.2 mg/dL Alk. phosphatase 69 39 - 117 IU/L  
 AST (SGOT) 24 0 - 40 IU/L  
 ALT (SGPT) 27 0 - 44 IU/L Narrative Performed at:  [address]  [id number] : Rojean Koyanagi MD, Phone:  [phone] LIPID PANEL Result Value Ref Range Cholesterol, total 196 100 - 199 mg/dL Triglyceride 118 0 - 149 mg/dL HDL Cholesterol 53 >39 mg/dL VLDL, calculated 24 5 - 40 mg/dL LDL, calculated 119 (H) 0 - 99 mg/dL Narrative Performed at:  [address]  [id number] : Rojean Koyanagi MD, Phone:  [phone] PSA, DIAGNOSTIC (PROSTATE SPECIFIC AG) Result Value Ref Range Prostate Specific Ag 1.4 0.0 - 4.0 ng/mL Comment:  
   Roche ECLIA methodology. According to the American Urological Association, Serum PSA should 
decrease and remain at undetectable levels after radical 
prostatectomy. The AUA defines biochemical recurrence as an initial 
PSA value 0.2 ng/mL or greater followed by a subsequent confirmatory PSA value 0.2 ng/mL or greater. Values obtained with different assay methods or kits cannot be used 
interchangeably. Results cannot be interpreted as absolute evidence 
of the presence or absence of malignant disease. Narrative Performed at:  [address] [place]  [id number] : Ramiro Bray MD, Phone:  [phone] HEMOGLOBIN A1C WITH EAG Result Value Ref Range Hemoglobin A1c 6.1 (H) 4.8 - 5.6 % Comment:  
            Prediabetes: 5.7 - 6.4 Diabetes: >6.4 Glycemic control for adults with diabetes: <7.0 Estimated average glucose 128 mg/dL Narrative Performed at:  [address]  [id number] : Ramiro Bray MD, Phone:  [phone] ANGIOTENSIN CONVERTING ENZYME Result Value Ref Range Angiotensin Converting Enzyme (ACE) 39 14 - 82 U/L Narrative Performed at:  [address]  [id number] : Ramiro Bray MD, Phone:  [phone] MICROSCOPIC EXAMINATION Result Value Ref Range WBC 0-5 0 - 5 /hpf  
 RBC 0-2 0 - 2 /hpf Epithelial cells 0-10 0 - 10 /hpf Casts None seen None seen /lpf Mucus Present Not Estab. Bacteria Few None seen/Few Narrative Performed at:  [address]  [id number] : Ramiro Bray MD, Phone:  [phone] Vida Cuellar MD, FACP, CMD

## 2018-11-02 NOTE — PROGRESS NOTES
1. Have you been to the ER, urgent care clinic since your last visit? Hospitalized since your last visit? No    2. Have you seen or consulted any other health care providers outside of the [address] [place] [address] since your last visit? Include any pap smears or colon screening.  No      Wants to discuss L&R heels

## 2018-11-02 NOTE — PROGRESS NOTES
SPORTS MEDICINE AND PRIMARY CARE  Valerie Almeida MD, [address] [address]  Phone:  [phone]  Fax: [phone]    Chief Complaint   Patient presents with    Establish Care       SUBJECTIVE:    Da Elizondo is a 56 y.o. male Patient comes in today to establish care. He has a known history of pulmonary sarcoidosis, pulmonary nodule, prediabetes, depression, vitamin D deficiency and is seen for evaluation. Patient comes in primarily for a wellness visit. He is also changing physicians. For the past couple months he has had heel pain, also notes lesions on his heel. He has been under the care of Dr. Lacie Solomon at [address] [place] [address] for sarcoidosis that was diagnosed about 4 1/2 years ago. He is on a prednisone taper. He has a diagnosis of ADHD for the past year and a half or two, diagnosed either by psychiatrist or psychologist, and has been placed on Adderall, which he takes three to four times a week. We advise him of our policy regarding Adderall, we would like to see the testing/diagnostic information before considering prescribing the drug. Patient is seen for evaluation. Current Outpatient Medications   Medication Sig Dispense Refill    predniSONE (DELTASONE) 20 mg tablet 60 mg.      triamcinolone acetonide (KENALOG) 0.1 % ointment Apply  to affected area two (2) times a day. use thin layer 80 g 11    [START ON 11/5/2018] Selenium Sulfide 2.25 % topical foam Apply  to affected area every Monday and Thursday. 1 Can 11    dextroamphetamine-amphetamine (ADDERALL) 10 mg tablet Take 1 Tab (10 mg total) by mouth two (2) times a day. Max Daily Amount: 20 mg 30 Tab 0    albuterol (PROVENTIL HFA, VENTOLIN HFA, PROAIR HFA) 90 mcg/actuation inhaler Take 2 Puffs by inhalation every four (4) hours as needed for Wheezing.  1 Inhaler 6     Past Medical History:   Diagnosis Date    Eczema     Eosinophilia     [facility]     Interstitial lung disease ([facility]     Plantar fasciitis, bilateral 11/02/2018    Pulmonary lesion     Sarcoidosis 2013     Past Surgical History:   Procedure Laterality Date    HX HEENT      wisdom teeth     No Known Allergies    REVIEW OF SYSTEMS:  General: negative for - chills or fever  ENT: negative for - headaches, nasal congestion or tinnitus  Respiratory: negative for - cough, hemoptysis, shortness of breath or wheezing  Cardiovascular : negative for - chest pain, edema, palpitations or shortness of breath  Gastrointestinal: negative for - abdominal pain, blood in stools, heartburn or nausea/vomiting  Genito-Urinary: no dysuria, trouble voiding, or hematuria  Musculoskeletal: negative for - gait disturbance, joint pain, joint stiffness or joint swelling  Neurological: no TIA or stroke symptoms  Hematologic: no bruises, no bleeding, no swollen glands  Integument: no lumps, mole changes, nail changes or rash  Endocrine:no malaise/lethargy or unexpected weight changes      Social History     Socioeconomic History    Marital status: SINGLE     Spouse name: Not on file    Number of children: Not on file    Years of education: Not on file    Highest education level: Not on file   Social Needs    Financial resource strain: Not on file    Food insecurity - worry: Not on file    Food insecurity - inability: Not on file   [employer] needs - medical: Not on file   [employer] needs - non-medical: Not on file   Occupational History    Not on file   Tobacco Use    Smoking status: Former Smoker     Packs/day: 0.25     Years: 16.00     Pack years: 4.00     Types: Cigarettes     Start date: 3/6/2000    Smokeless tobacco: Never Used   Substance and Sexual Activity    Alcohol use:  Yes     Alcohol/week: 0.0 oz     Comment: occasional    Drug use: No    Sexual activity: Yes     Partners: Female     Birth control/protection: Condom   Other Topics Concern    Not on file   Social History Narrative    Not on file     Family History   Problem Relation Age of Onset    Arthritis-osteo Mother     Asthma Mother     No Known Problems Father    [facility] Migra[facility] Sister     No Known Problems Sister    Habits:  She discontinued cigarette abuse 2016. He has occasional weed. He drinks maybe a glass of wine a week. No substance abuse. Social History:  The patient is single. He is the father of a son 16years old. He completed his bachelor's degree at [employer]. Did not complete his masters degree, but started working on it. He is self employed. He has no Oriental orthodox preference. Family History:  Father  39 of unknown cause. He wondered if his dad was poisoned, but he was only [de-identified] years old at the time. Mother is 61. She has had a brain tumor. She also has thyroid disease. Half brother, half sister alive and well. OBJECTIVE:     Visit Vitals  /79   Pulse 84   Temp 97.8 °F (36.6 °C) (Oral)   Resp 18   Ht 6' 2\" (1.88 m)   Wt 223 lb 11.2 oz (101.5 kg)   SpO2 94%   BMI 28.72 kg/m²     CONSTITUTIONAL: well , well nourished, appears age appropriate  EYES: perrla, eom intact  ENMT:moist mucous membranes, pharynx clear  NECK: supple. Thyroid normal  RESPIRATORY: Chest: clear bilaterally  CARDIOVASCULAR: Heart: regular rate and rhythm  GASTROINTESTINAL: Abdomen: soft, bowel sounds active  HEMATOLOGIC: no pathological lymph nodes palpated  MUSCULOSKELETAL: Extremities: no edema, pulse 1+   INTEGUMENT: No unusual rashes or suspicious skin lesions noted.  Nails appear normal.  NEUROLOGIC: non-focal exam   MENTAL STATUS: alert and oriented, appropriate affect     Orders Only on 10/15/2018   Component Date Value Ref Range Status    Triglyceride 10/15/2018 104  40 - 149 mg/dL Final    HDL Cholesterol 10/15/2018 52  40 - 59 mg/dL Final    Cholesterol, total 10/15/2018 200  110 - 200 mg/dL Final    CHOLESTEROL/HDL 10/15/2018 3.8  0.0 - 5.0 Final    LDL, calculated 10/15/2018 127* 50 - 99 mg/dL Final    VLDL, calculated 10/15/2018 21  8 - 30 mg/dL Final    Comment: Test includes cholesterol, HDL cholesterol, triglycerides and LDL. Cholesterol Recommended NCEP guidelines in mg/dL:  Less than 200            Desirable  200 - 239                Borderline High  Greater than or  = 240   High  Please Note:  Total Chol/HDL Ratio                   Men     Women  1/2 Avg. Risk    3.4     3.3      Avg. Risk    5.0     4.4  2X  Avg. Risk    9.6     7.1  3X  Avg. Risk   23.4    11.0      VITAMIN D, 25-HYDROXY 10/15/2018 22.1* 32.0 - 100.0 ng/mL Final    Hemoglobin A1c 10/15/2018 6.1* 4.8 - 5.9 % Final    AVG GLU 10/15/2018 128* 91 - 123 mg/dL Final       ASSESSMENT:   1. Well adult exam    2. Dermatitis    3. Vitamin D deficiency    4. Smoker    5. Sarcoidosis    6. Pure hypercholesterolemia    7. Pulmonary nodule    8. Prediabetes    9. Major depressive disorder with single episode, in partial remission ([facility]    10. Eczema, unspecified type    11. Plantar fasciitis, bilateral      Patient's medical status is generally stable. Some issues he is addressing with the pulmonary doctor related to sarcoidosis and unfortunately he is on a steroid taper. We certainly hope and wish his success in getting off the steroids altogether. Will ask the pulmonary specialist to keep us advised of his progress notes. We will send him request for records release. Regarding his ADHD, we advised him that we cannot write the prescription for Adderall at this time . We certainly will consider it after we receive the testing and notes. His BMI represents overweight. Encouraged physical activity 30 minutes five days a week and a heart healthy diet. His foot represents plantar fasciitis. We give him appropriate exercises to do and talk about appropriate footwear. His blood pressure control is at goal.    We renew his creams. He will back to see us formally in a year for his annual physical examination.   We ask him to get a colonoscopy and we give him that referral.  We advise him he can come in to see us at any time if he is unable to get an appointment and needs to be seen. We will send him the lab results in the mail. Advised him about MyChart. I have discussed the diagnosis with the patient and the intended plan as seen in the  orders above. The patient understands and agees with the plan. The patient has   received an after visit summary and questions were answered concerning  future plans  Patient labs and/or xrays were reviewed  Past records were reviewed. PLAN:  .  Orders Placed This Encounter    URINALYSIS W/ RFLX MICROSCOPIC    CBC WITH AUTOMATED DIFF    METABOLIC PANEL, COMPREHENSIVE    LIPID PANEL    PROSTATE SPECIFIC AG    HEMOGLOBIN A1C WITH EAG    ANGIOTENSIN CONVERTING ENZYME    REFERRAL TO GASTROENTEROLOGY    REFERRAL TO OPHTHALMOLOGY    EKG, 12 LEAD, INITIAL    predniSONE (DELTASONE) 20 mg tablet    triamcinolone acetonide (KENALOG) 0.1 % ointment    Selenium Sulfide 2.25 % topical foam       Follow-up Disposition:  Return in about 1 year (around 11/2/2019). ATTENTION:   This medical record was transcribed using an electronic medical records system. Although proofread, it may and can contain electronic and spelling errors. Other human spelling and other errors may be present. Corrections may be executed at a later time. Please feel free to contact us for any clarifications as needed.

## 2018-11-05 LAB
ACE SERPL-CCNC: 39 U/L (ref 14–82)
ALBUMIN SERPL-MCNC: 4.3 G/DL (ref 3.5–5.5)
ALBUMIN/GLOB SERPL: 1.7 {RATIO} (ref 1.2–2.2)
ALP SERPL-CCNC: 69 IU/L (ref 39–117)
ALT SERPL-CCNC: 27 IU/L (ref 0–44)
APPEARANCE UR: CLEAR
AST SERPL-CCNC: 24 IU/L (ref 0–40)
BACTERIA #/AREA URNS HPF: NORMAL /[HPF]
BASOPHILS # BLD AUTO: 0 X10E3/UL (ref 0–0.2)
BASOPHILS NFR BLD AUTO: 1 %
BILIRUB SERPL-MCNC: 0.3 MG/DL (ref 0–1.2)
BILIRUB UR QL STRIP: NEGATIVE
BUN SERPL-MCNC: 9 MG/DL (ref 6–24)
BUN/CREAT SERPL: 8 (ref 9–20)
CALCIUM SERPL-MCNC: 10 MG/DL (ref 8.7–10.2)
CASTS URNS QL MICRO: NORMAL /LPF
CHLORIDE SERPL-SCNC: 99 MMOL/L (ref 96–106)
CHOLEST SERPL-MCNC: 196 MG/DL (ref 100–199)
CO2 SERPL-SCNC: 27 MMOL/L (ref 20–29)
COLOR UR: YELLOW
CREAT SERPL-MCNC: 1.11 MG/DL (ref 0.76–1.27)
EOSINOPHIL # BLD AUTO: 0.1 X10E3/UL (ref 0–0.4)
EOSINOPHIL NFR BLD AUTO: 1 %
EPI CELLS #/AREA URNS HPF: NORMAL /HPF
ERYTHROCYTE [DISTWIDTH] IN BLOOD BY AUTOMATED COUNT: 14.8 % (ref 12.3–15.4)
EST. AVERAGE GLUCOSE BLD GHB EST-MCNC: 128 MG/DL
GLOBULIN SER CALC-MCNC: 2.5 G/DL (ref 1.5–4.5)
GLUCOSE SERPL-MCNC: 93 MG/DL (ref 65–99)
GLUCOSE UR QL: NEGATIVE
HBA1C MFR BLD: 6.1 % (ref 4.8–5.6)
HCT VFR BLD AUTO: 44.7 % (ref 37.5–51)
HDLC SERPL-MCNC: 53 MG/DL
HGB BLD-MCNC: 14.3 G/DL (ref 13–17.7)
HGB UR QL STRIP: NEGATIVE
IMM GRANULOCYTES # BLD: 0 X10E3/UL (ref 0–0.1)
IMM GRANULOCYTES NFR BLD: 0 %
KETONES UR QL STRIP: NEGATIVE
LDLC SERPL CALC-MCNC: 119 MG/DL (ref 0–99)
LEUKOCYTE ESTERASE UR QL STRIP: NEGATIVE
LYMPHOCYTES # BLD AUTO: 1.6 X10E3/UL (ref 0.7–3.1)
LYMPHOCYTES NFR BLD AUTO: 21 %
MCH RBC QN AUTO: 25.6 PG (ref 26.6–33)
MCHC RBC AUTO-ENTMCNC: 32 G/DL (ref 31.5–35.7)
MCV RBC AUTO: 80 FL (ref 79–97)
MICRO URNS: ABNORMAL
MONOCYTES # BLD AUTO: 0.4 X10E3/UL (ref 0.1–0.9)
MONOCYTES NFR BLD AUTO: 6 %
MUCOUS THREADS URNS QL MICRO: PRESENT
NEUTROPHILS # BLD AUTO: 5.3 X10E3/UL (ref 1.4–7)
NEUTROPHILS NFR BLD AUTO: 71 %
NITRITE UR QL STRIP: POSITIVE
PH UR STRIP: 7.5 [PH] (ref 5–7.5)
PLATELET # BLD AUTO: 187 X10E3/UL (ref 150–379)
POTASSIUM SERPL-SCNC: 4.5 MMOL/L (ref 3.5–5.2)
PROT SERPL-MCNC: 6.8 G/DL (ref 6–8.5)
PROT UR QL STRIP: ABNORMAL
PSA SERPL-MCNC: 1.4 NG/ML (ref 0–4)
RBC # BLD AUTO: 5.58 X10E6/UL (ref 4.14–5.8)
RBC #/AREA URNS HPF: NORMAL /HPF
SODIUM SERPL-SCNC: 140 MMOL/L (ref 134–144)
SP GR UR: 1.03 (ref 1–1.03)
TRIGL SERPL-MCNC: 118 MG/DL (ref 0–149)
UROBILINOGEN UR STRIP-MCNC: 1 MG/DL (ref 0.2–1)
VLDLC SERPL CALC-MCNC: 24 MG/DL (ref 5–40)
WBC # BLD AUTO: 7.4 X10E3/UL (ref 3.4–10.8)
WBC #/AREA URNS HPF: NORMAL /HPF

## 2019-03-27 ENCOUNTER — OFFICE VISIT (OUTPATIENT)
Dept: INTERNAL MEDICINE CLINIC | Age: 49
End: 2019-03-27

## 2019-03-27 VITALS
SYSTOLIC BLOOD PRESSURE: 111 MMHG | WEIGHT: 232 LBS | HEIGHT: 74 IN | OXYGEN SATURATION: 95 % | DIASTOLIC BLOOD PRESSURE: 70 MMHG | TEMPERATURE: 98.2 F | BODY MASS INDEX: 29.77 KG/M2 | HEART RATE: 81 BPM | RESPIRATION RATE: 16 BRPM

## 2019-03-27 DIAGNOSIS — R73.03 PREDIABETES: ICD-10-CM

## 2019-03-27 DIAGNOSIS — F98.8 ATTENTION DEFICIT DISORDER, UNSPECIFIED HYPERACTIVITY PRESENCE: ICD-10-CM

## 2019-03-27 DIAGNOSIS — R91.1 PULMONARY NODULE: ICD-10-CM

## 2019-03-27 DIAGNOSIS — L30.9 ECZEMA, UNSPECIFIED TYPE: Primary | ICD-10-CM

## 2019-03-27 DIAGNOSIS — F17.200 SMOKER: ICD-10-CM

## 2019-03-27 DIAGNOSIS — F32.4 MAJOR DEPRESSIVE DISORDER WITH SINGLE EPISODE, IN PARTIAL REMISSION (HCC): ICD-10-CM

## 2019-03-27 DIAGNOSIS — G44.229 CHRONIC TENSION-TYPE HEADACHE, NOT INTRACTABLE: ICD-10-CM

## 2019-03-27 NOTE — PROGRESS NOTES
Mandeep Chin is a 53 y.o. male     Chief Complaint   Patient presents with    Behavioral Problem     follow up c/o redness in left eye noticed it yesterday morning. Visit Vitals  /70 (BP 1 Location: Left arm, BP Patient Position: Sitting)   Pulse 81   Temp 98.2 °F (36.8 °C) (Oral)   Resp 16   Ht 6' 2\" (1.88 m)   Wt 232 lb (105.2 kg)   SpO2 95%   BMI 29.79 kg/m²       Health Maintenance Due   Topic Date Due    DTaP/Tdap/Td series (1 - Tdap) 11/10/1992    Influenza Age 5 to Adult  08/01/2018       1. Have you been to the ER, urgent care clinic since your last visit? Hospitalized since your last visit? No    2. Have you seen or consulted any other health care providers outside of the [address] [place] [address] since your last visit? Include any pap smears or colon screening.  No

## 2019-05-24 ENCOUNTER — OFFICE VISIT (OUTPATIENT)
Dept: INTERNAL MEDICINE CLINIC | Age: 49
End: 2019-05-24

## 2019-05-24 VITALS
DIASTOLIC BLOOD PRESSURE: 82 MMHG | TEMPERATURE: 97.9 F | SYSTOLIC BLOOD PRESSURE: 127 MMHG | BODY MASS INDEX: 29.14 KG/M2 | WEIGHT: 227.1 LBS | HEIGHT: 74 IN | RESPIRATION RATE: 16 BRPM | OXYGEN SATURATION: 94 % | HEART RATE: 71 BPM

## 2019-05-24 DIAGNOSIS — J84.9 ILD (INTERSTITIAL LUNG DISEASE) (HCC): ICD-10-CM

## 2019-05-24 DIAGNOSIS — R73.03 PREDIABETES: ICD-10-CM

## 2019-05-24 DIAGNOSIS — F98.8 ATTENTION DEFICIT DISORDER, UNSPECIFIED HYPERACTIVITY PRESENCE: ICD-10-CM

## 2019-05-24 DIAGNOSIS — D86.9 SARCOIDOSIS: ICD-10-CM

## 2019-05-24 DIAGNOSIS — F32.4 MAJOR DEPRESSIVE DISORDER WITH SINGLE EPISODE, IN PARTIAL REMISSION (HCC): ICD-10-CM

## 2019-05-24 DIAGNOSIS — F17.200 SMOKER: ICD-10-CM

## 2019-05-24 DIAGNOSIS — F90.9 ATTENTION DEFICIT HYPERACTIVITY DISORDER (ADHD), UNSPECIFIED ADHD TYPE: Primary | ICD-10-CM

## 2019-05-24 RX ORDER — DEXTROAMPHETAMINE SACCHARATE, AMPHETAMINE ASPARTATE, DEXTROAMPHETAMINE SULFATE AND AMPHETAMINE SULFATE 2.5; 2.5; 2.5; 2.5 MG/1; MG/1; MG/1; MG/1
10 TABLET ORAL DAILY
Qty: 30 TAB | Refills: 0 | Status: SHIPPED | OUTPATIENT
Start: 2019-05-24 | End: 2019-06-28 | Stop reason: SDUPTHER

## 2019-05-24 NOTE — PROGRESS NOTES
Chief Complaint   Patient presents with    Abdominal Pain     1. Have you been to the ER, urgent care clinic since your last visit? Hospitalized since your last visit? No    2. Have you seen or consulted any other health care providers outside of the [address] [place] [address] since your last visit? Include any pap smears or colon screening.  No

## 2019-05-24 NOTE — PROGRESS NOTES
SPORTS MEDICINE AND PRIMARY CARE  Isaiah Doyle MD, [address] [address]  Phone:  [phone]  Fax: [phone]       Chief Complaint   Patient presents with    Abdominal Pain   . SUBJECTIVE:    Jung Pablo is a 53 y.o. male Patient returns today as an ex cigarette smoker, pulmonary sarcoidosis, major depressive disorder, interstitial lung disease, ADHD, chronic tension type headache, and is seen for evaluation. About three days ago he had an episode of severe mid abdominal discomfort that lasted for about four hours, 9/10.  _______________ diaphoresis. No nausea or vomiting, change in bowel habits. It subsided spontaneously. He has had no further discomfort. Had not made the appointment to see us for this discomfort and is seen for evaluation. He has no pain now. Current Outpatient Medications   Medication Sig Dispense Refill    dextroamphetamine-amphetamine (ADDERALL) 10 mg tablet Take 1 Tab by mouth daily. Max Daily Amount: 10 mg. 30 Tab 0    triamcinolone acetonide (KENALOG) 0.1 % ointment Apply  to affected area two (2) times a day. use thin layer 80 g 11    Selenium Sulfide 2.25 % topical foam Apply  to affected area every Monday and Thursday. 1 Can 11    albuterol (PROVENTIL HFA, VENTOLIN HFA, PROAIR HFA) 90 mcg/actuation inhaler Take 2 Puffs by inhalation every four (4) hours as needed for Wheezing.  1 Inhaler 6     Past Medical History:   Diagnosis Date    ADHD     Eczema     Eosinophilia     ILD (interstitial lung disease) ([address]     Interstitial lung disease ([address]     Plantar fasciitis, bilateral 11/02/2018    Pulmonary lesion     Sarcoidosis 2013     Past Surgical History:   Procedure Laterality Date    HX HEENT      wisdom teeth     No Known Allergies      REVIEW OF SYSTEMS:  General: negative for - chills or fever  ENT: negative for - headaches, nasal congestion or tinnitus  Respiratory: negative for - cough, hemoptysis, shortness of breath or wheezing  Cardiovascular : negative for - chest pain, edema, palpitations or shortness of breath  Gastrointestinal: negative for - abdominal pain, blood in stools, heartburn or nausea/vomiting  Genito-Urinary: no dysuria, trouble voiding, or hematuria  Musculoskeletal: negative for - gait disturbance, joint pain, joint stiffness or joint swelling  Neurological: no TIA or stroke symptoms  Hematologic: no bruises, no bleeding, no swollen glands  Integument: no lumps, mole changes, nail changes or rash  Endocrine: no malaise/lethargy or unexpected weight changes      Social History     Socioeconomic History    Marital status: SINGLE     Spouse name: Not on file    Number of children: Not on file    Years of education: Not on file    Highest education level: Not on file   Tobacco Use    Smoking status: Former Smoker     Packs/day: 0.25     Years: 16.00     Pack years: 4.00     Types: Cigarettes     Start date: 3/6/2000    Smokeless tobacco: Never Used   Substance and Sexual Activity    Alcohol use: Yes     Alcohol/week: 0.0 oz     Comment: occasional    Drug use: No    Sexual activity: Yes     Partners: Female     Birth control/protection: Condom   Social History Narrative    Habits:  he discontinued cigarette abuse . He has occasional weed. He drinks maybe a glass of wine a week. No substance abuse.         Social History:  The patient is single. He is the father of a son 16years old. He completed his bachelor's degree at [employer]. Did not complete his masters degree, but started working on it. He is self employed. He has no Worship preference.         Family History:  Father  39 of unknown cause. He wondered if his dad was poisoned, but he was only [de-identified] years old at the time. Mother is 61. She has had a brain tumor. She also has thyroid disease. Half brother, half sister alive and well.      Family History   Problem Relation Age of Onset    Arthritis-osteo Mother  Asthma Mother     No Known Problems Father     Migraines Sister     No Known Problems Sister        OBJECTIVE:    Visit Vitals  /82   Pulse 71   Temp 97.9 °F (36.6 °C) (Oral)   Resp 16   Ht 6' 2\" (1.88 m)   Wt 227 lb 1.6 oz (103 kg)   SpO2 94%   BMI 29.16 kg/m²     CONSTITUTIONAL: well , well nourished, appears age appropriate  EYES: perrla, eom intact  ENMT:moist mucous membranes, pharynx clear  NECK: supple. Thyroid normal  RESPIRATORY: Chest: clear bilaterally   CARDIOVASCULAR: Heart: regular rate and rhythm  GASTROINTESTINAL: Abdomen: soft, bowel sounds active  HEMATOLOGIC: no pathological lymph nodes palpated  MUSCULOSKELETAL: Extremities: no edema, pulse 1+   INTEGUMENT: No unusual rashes or suspicious skin lesions noted. Nails appear normal.  NEUROLOGIC: non-focal exam   MENTAL STATUS: alert and oriented, appropriate affect           ASSESSMENT:  1. Attention deficit hyperactivity disorder (ADHD), unspecified ADHD type    2. Smoker    3. Sarcoidosis    4. Prediabetes    5. Major depressive disorder with single episode, in partial remission ([facility]    6. ILD (interstitial lung disease) ([facility]    7. Attention deficit disorder, unspecified hyperactivity presence      The ______________ is completely resolved. Stone, either gallbladder or kidney stone, may have been the etiology, as well as hepatic flexure syndrome. He prefers not for us to look into it at this time. If it happens again he will call us, at which time we will send him over either for a CT or abdominal ultrasound. He has been out of his Adderall for two months. We recall that he moved here from [facility] and was seeing a physician there for the medication. He wonders if we can ______________. He has no documentation to confirm the diagnosis. We advise him we will give him a 30 day supply for him to get the documentation. The documentation will need to be notes from psychiatrist or psychologist, not a family practitioner.   If he is unable to get the documentation will refer him to [facility] for appropriate testing, at which time we could continue to give him the prescription, but without that documentation there will be no further prescriptions from our office. We advise him for the first three months he will have to see us on each occasion to get the prescription and thereafter he can get the prescription once a month, but has to be seen every three months thereafter. Today we will ask him to sign the contract and give us a urine for drug screen and the review . He is agreeable to all the concerns/rules documented. In addition he would like to see a psychiatrist and we give him referral.      I have discussed the diagnosis with the patient and the intended plan as seen in the  orders above. The patient understands and agees with the plan. The patient has   received an after visit summary and questions were answered concerning  future plans  Patient labs and/or xrays were reviewed  Past records were reviewed. PLAN:  .  Orders Placed This Encounter    PAIN MGMT PANEL W/REFL, UR    REFERRAL TO PSYCHIATRY    dextroamphetamine-amphetamine (ADDERALL) 10 mg tablet       Follow-up and Dispositions    · Return in about 1 month (around 6/21/2019). ATTENTION:   This medical record was transcribed using an electronic medical records system. Although proofread, it may and can contain electronic and spelling errors. Other human spelling and other errors may be present. Corrections may be executed at a later time. Please feel free to contact us for any clarifications as needed.

## 2019-05-25 LAB
AMPHETAMINES UR QL SCN: NEGATIVE NG/ML
BARBITURATES UR QL SCN: NEGATIVE NG/ML
BENZODIAZ UR QL SCN: NEGATIVE NG/ML
BZE UR QL SCN: NEGATIVE NG/ML
CANNABINOIDS UR QL SCN: NEGATIVE NG/ML
CREAT UR-MCNC: 223 MG/DL (ref 20–300)
FENTANYL+NORFENTANYL UR QL SCN: NEGATIVE PG/ML
MEPERIDINE UR QL: NEGATIVE NG/ML
METHADONE UR QL SCN: NEGATIVE NG/ML
OPIATES UR QL SCN: NEGATIVE NG/ML
OXYCODONE+OXYMORPHONE UR QL SCN: NEGATIVE NG/ML
PCP UR QL: NEGATIVE NG/ML
PH UR: 5.9 [PH] (ref 4.5–8.9)
PLEASE NOTE:, 733157: NORMAL
PROPOXYPH UR QL SCN: NEGATIVE NG/ML
SP GR UR: 1.02
TRAMADOL UR QL SCN: NEGATIVE NG/ML

## 2019-06-28 ENCOUNTER — OFFICE VISIT (OUTPATIENT)
Dept: INTERNAL MEDICINE CLINIC | Age: 49
End: 2019-06-28

## 2019-06-28 VITALS
HEIGHT: 74 IN | HEART RATE: 80 BPM | WEIGHT: 224.5 LBS | OXYGEN SATURATION: 93 % | DIASTOLIC BLOOD PRESSURE: 75 MMHG | TEMPERATURE: 98 F | RESPIRATION RATE: 20 BRPM | SYSTOLIC BLOOD PRESSURE: 115 MMHG | BODY MASS INDEX: 28.81 KG/M2

## 2019-06-28 DIAGNOSIS — D86.9 SARCOIDOSIS: ICD-10-CM

## 2019-06-28 DIAGNOSIS — D86.0 PULMONARY SARCOIDOSIS (HCC): ICD-10-CM

## 2019-06-28 DIAGNOSIS — F90.9 ATTENTION DEFICIT HYPERACTIVITY DISORDER (ADHD), UNSPECIFIED ADHD TYPE: ICD-10-CM

## 2019-06-28 DIAGNOSIS — L30.9 ECZEMA, UNSPECIFIED TYPE: ICD-10-CM

## 2019-06-28 DIAGNOSIS — F98.8 ATTENTION DEFICIT DISORDER, UNSPECIFIED HYPERACTIVITY PRESENCE: ICD-10-CM

## 2019-06-28 DIAGNOSIS — L73.9 FOLLICULITIS: Primary | ICD-10-CM

## 2019-06-28 RX ORDER — DEXTROAMPHETAMINE SACCHARATE, AMPHETAMINE ASPARTATE, DEXTROAMPHETAMINE SULFATE AND AMPHETAMINE SULFATE 2.5; 2.5; 2.5; 2.5 MG/1; MG/1; MG/1; MG/1
10 TABLET ORAL DAILY
Qty: 30 TAB | Refills: 0 | Status: SHIPPED | OUTPATIENT
Start: 2019-06-28 | End: 2022-06-30 | Stop reason: SDUPTHER

## 2019-06-28 RX ORDER — SULFAMETHOXAZOLE AND TRIMETHOPRIM 800; 160 MG/1; MG/1
1 TABLET ORAL
Qty: 6 TAB | Refills: 3 | Status: SHIPPED | OUTPATIENT
Start: 2019-06-28 | End: 2019-07-12

## 2019-06-28 NOTE — PROGRESS NOTES
1. Have you been to the ER, urgent care clinic since your last visit? Hospitalized since your last visit? No    2. Have you seen or consulted any other health care providers outside of the [address] [place] [address] since your last visit? Include any pap smears or colon screening.  No     Wants to discuss skin condition

## 2019-06-28 NOTE — PROGRESS NOTES
SPORTS MEDICINE AND PRIMARY CARE  Emilio Pablo MD, [address] [address]  Phone:  [phone]  Fax: [phone]       Chief Complaint   Patient presents with    Behavioral Problem   . SUBJECTIVE:    Julián Murillo is a 53 y.o. male The patient returns today with a known history of ADD, ADHD, eczematoid dermatitis, folliculitis, and sarcoidosis followed by Dr. Johanna Hemphill and seen for evaluation. Since we last saw him, his previous psychiatrist, [address] [place] [address] Psychiatry, went out of business and the records were apparently transferred to Dr. Minerva Toro. We will request his records from that physician. The patient goes on to tell us that he is under the care of Pulmonary also for what sounds like a folliculitis of his face and also of his scalp. He usually takes Bactrim on Monday, Wednesday and Friday for a 2 week period when it flares. Other new complaints denied. The patient seen for evaluation. Current Outpatient Medications   Medication Sig Dispense Refill    dextroamphetamine-amphetamine (ADDERALL) 10 mg tablet Take 1 Tab by mouth daily. Max Daily Amount: 10 mg. 30 Tab 0    trimethoprim-sulfamethoxazole (BACTRIM DS, SEPTRA DS) 160-800 mg per tablet Take 1 Tab by mouth every Monday, Wednesday, Friday for 14 days. 6 Tab 3    triamcinolone acetonide (KENALOG) 0.1 % ointment Apply  to affected area two (2) times a day. use thin layer 80 g 11    Selenium Sulfide 2.25 % topical foam Apply  to affected area every Monday and Thursday. 1 Can 11    albuterol (PROVENTIL HFA, VENTOLIN HFA, PROAIR HFA) 90 mcg/actuation inhaler Take 2 Puffs by inhalation every four (4) hours as needed for Wheezing.  1 Inhaler 6     Past Medical History:   Diagnosis Date    ADHD     Eczema     Eosinophilia     Folliculitis     ILD [facility]interstitial lung disease) ([facility] [address]     Interstitial lung disease (HCC)     Plantar fasciitis, bilateral 11/02/2018    Pulmonary lesion     Sarcoidosis      Past Surgical History:   Procedure Laterality Date    HX HEENT      wisdom teeth     No Known Allergies      REVIEW OF SYSTEMS:  General: negative for - chills or fever  ENT: negative for - headaches, nasal congestion or tinnitus  Respiratory: negative for - cough, hemoptysis, shortness of breath or wheezing  Cardiovascular : negative for - chest pain, edema, palpitations or shortness of breath  Gastrointestinal: negative for - abdominal pain, blood in stools, heartburn or nausea/vomiting  Genito-Urinary: no dysuria, trouble voiding, or hematuria  Musculoskeletal: negative for - gait disturbance, joint pain, joint stiffness or joint swelling  Neurological: no TIA or stroke symptoms  Hematologic: no bruises, no bleeding, no swollen glands  Integument: no lumps, mole changes, nail changes or rash  Endocrine: no malaise/lethargy or unexpected weight changes      Social History     Socioeconomic History    Marital status: SINGLE     Spouse name: Not on file    Number of children: Not on file    Years of education: Not on file    Highest education level: Not on file   Tobacco Use    Smoking status: Former Smoker     Packs/day: 0.25     Years: 16.00     Pack years: 4.00     Types: Cigarettes     Start date: 3/6/2000    Smokeless tobacco: Never Used   Substance and Sexual Activity    Alcohol use: Yes     Alcohol/week: 0.0 oz     Comment: occasional    Drug use: No    Sexual activity: Yes     Partners: Female     Birth control/protection: Condom   Social History Narrative    Habits:  he discontinued cigarette abuse . He has occasional weed. He drinks maybe a glass of wine a week. No substance abuse.         Social History:  The patient is single. He is the father of a son 16years old. He completed his bachelor's degree at [employer]. Did not complete his masters degree, but started working on it. He is self employed.   He has no Taoism preference.         Family History:  Father  36 of unknown cause. He wondered if his dad was poisoned, but he was only [de-identified] years old at the time. Mother is 61. She has had a brain tumor. She also has thyroid disease. Half brother, half sister alive and well. Family History   Problem Relation Age of Onset   Martin Robbins Arthritis-osteo Mother     Asthma Mother     No Known Problems Father     Migraines Sister     No Known Problems Sister        OBJECTIVE:    Visit Vitals  /75   Pulse 80   Temp 98 °F (36.7 °C) (Oral)   Resp 20   Ht 6' 2\" (1.88 m)   Wt 224 lb 8 oz (101.8 kg)   SpO2 93%   BMI 28.82 kg/m²     CONSTITUTIONAL: well , well nourished, appears age appropriate  EYES: perrla, eom intact  ENMT:moist mucous membranes, pharynx clear  NECK: supple. Thyroid normal  RESPIRATORY: Chest: clear bilaterally   CARDIOVASCULAR: Heart: regular rate and rhythm  GASTROINTESTINAL: Abdomen: soft, bowel sounds active  HEMATOLOGIC: no pathological lymph nodes palpated  MUSCULOSKELETAL: Extremities: no edema, pulse 1+   INTEGUMENT: No unusual rashes or suspicious skin lesions noted. Nails appear normal.  NEUROLOGIC: non-focal exam   MENTAL STATUS: alert and oriented, appropriate affect           ASSESSMENT:  1. Folliculitis    2. Attention deficit disorder, unspecified hyperactivity presence    3. Eczema, unspecified type    4. Sarcoidosis    5. Pulmonary sarcoidosis ([address]    6. Attention deficit hyperactivity disorder (ADHD), unspecified ADHD type      We will give him a prescription for the Bactrim with refills since    is flaring. We will renew the Adderall. We are still awaiting confirmation of the diagnosis. He is actually working on trying to get the records. We will try and help him with it and if unsuccessful he will have another psychiatrist give him some diagnosis. If he is not successful with the diagnosis with the psychiatrist we will refer him to neuropsychologic testing as we need confirmation of the diagnosis going forward.   He is completely aware of this and pleased with the plan. He is asymptomatic from a pulmonary perspective. Blood pressure control has been adequate. He will come back to see us in a month. He will see us monthly until we confirm the diagnosis. Thereafter, he will see us every 3 months just to get the medication. I have discussed the diagnosis with the patient and the intended plan as seen in the  orders above. The patient understands and agees with the plan. The patient has   received an after visit summary and questions were answered concerning  future plans  Patient labs and/or xrays were reviewed  Past records were reviewed. PLAN:  .  Orders Placed This Encounter    dextroamphetamine-amphetamine (ADDERALL) 10 mg tablet    trimethoprim-sulfamethoxazole (BACTRIM DS, SEPTRA DS) 160-800 mg per tablet       Follow-up and Dispositions    · Return in about 1 month (around 7/26/2019). ATTENTION:   This medical record was transcribed using an electronic medical records system. Although proofread, it may and can contain electronic and spelling errors. Other human spelling and other errors may be present. Corrections may be executed at a later time. Please feel free to contact us for any clarifications as needed.

## 2019-07-08 ENCOUNTER — ANESTHESIA (OUTPATIENT)
Dept: ENDOSCOPY | Age: 49
End: 2019-07-08
Payer: MEDICAID

## 2019-07-08 ENCOUNTER — ANESTHESIA EVENT (OUTPATIENT)
Dept: ENDOSCOPY | Age: 49
End: 2019-07-08
Payer: MEDICAID

## 2019-07-08 ENCOUNTER — HOSPITAL ENCOUNTER (OUTPATIENT)
Age: 49
Setting detail: OUTPATIENT SURGERY
Discharge: HOME | End: 2019-07-08
Attending: INTERNAL MEDICINE | Admitting: INTERNAL MEDICINE
Payer: MEDICAID

## 2019-07-08 VITALS
HEIGHT: 74 IN | WEIGHT: 224 LBS | RESPIRATION RATE: 15 BRPM | SYSTOLIC BLOOD PRESSURE: 126 MMHG | DIASTOLIC BLOOD PRESSURE: 84 MMHG | TEMPERATURE: 97.7 F | OXYGEN SATURATION: 96 % | HEART RATE: 62 BPM | BODY MASS INDEX: 28.75 KG/M2

## 2019-07-08 PROCEDURE — 74011250636 HC RX REV CODE- 250/636

## 2019-07-08 PROCEDURE — 76040000019: Performed by: INTERNAL MEDICINE

## 2019-07-08 PROCEDURE — 77030027957 HC TBNG IRR ENDOGTR BUSS -B: Performed by: INTERNAL MEDICINE

## 2019-07-08 PROCEDURE — 76060000031 HC ANESTHESIA FIRST 0.5 HR: Performed by: INTERNAL MEDICINE

## 2019-07-08 PROCEDURE — 88305 TISSUE EXAM BY PATHOLOGIST: CPT

## 2019-07-08 PROCEDURE — 77030009426 HC FCPS BIOP ENDOSC BSC -B: Performed by: INTERNAL MEDICINE

## 2019-07-08 RX ORDER — PROPOFOL 10 MG/ML
INJECTION, EMULSION INTRAVENOUS AS NEEDED
Status: DISCONTINUED | OUTPATIENT
Start: 2019-07-08 | End: 2019-07-08 | Stop reason: HOSPADM

## 2019-07-08 RX ORDER — SODIUM CHLORIDE 9 MG/ML
50 INJECTION, SOLUTION INTRAVENOUS CONTINUOUS
Status: DISCONTINUED | OUTPATIENT
Start: 2019-07-08 | End: 2019-07-08 | Stop reason: HOSPADM

## 2019-07-08 RX ORDER — SODIUM CHLORIDE 0.9 % (FLUSH) 0.9 %
5-40 SYRINGE (ML) INJECTION EVERY 8 HOURS
Status: DISCONTINUED | OUTPATIENT
Start: 2019-07-08 | End: 2019-07-08 | Stop reason: HOSPADM

## 2019-07-08 RX ORDER — LIDOCAINE HYDROCHLORIDE 20 MG/ML
INJECTION, SOLUTION EPIDURAL; INFILTRATION; INTRACAUDAL; PERINEURAL AS NEEDED
Status: DISCONTINUED | OUTPATIENT
Start: 2019-07-08 | End: 2019-07-08 | Stop reason: HOSPADM

## 2019-07-08 RX ORDER — NALOXONE HYDROCHLORIDE 0.4 MG/ML
0.4 INJECTION, SOLUTION INTRAMUSCULAR; INTRAVENOUS; SUBCUTANEOUS
Status: DISCONTINUED | OUTPATIENT
Start: 2019-07-08 | End: 2019-07-08 | Stop reason: HOSPADM

## 2019-07-08 RX ORDER — MIDAZOLAM HYDROCHLORIDE 1 MG/ML
.25-5 INJECTION, SOLUTION INTRAMUSCULAR; INTRAVENOUS
Status: DISCONTINUED | OUTPATIENT
Start: 2019-07-08 | End: 2019-07-08 | Stop reason: HOSPADM

## 2019-07-08 RX ORDER — DEXTROMETHORPHAN/PSEUDOEPHED 2.5-7.5/.8
1.2 DROPS ORAL
Status: DISCONTINUED | OUTPATIENT
Start: 2019-07-08 | End: 2019-07-08 | Stop reason: HOSPADM

## 2019-07-08 RX ORDER — FENTANYL CITRATE 50 UG/ML
25-200 INJECTION, SOLUTION INTRAMUSCULAR; INTRAVENOUS
Status: DISCONTINUED | OUTPATIENT
Start: 2019-07-08 | End: 2019-07-08 | Stop reason: HOSPADM

## 2019-07-08 RX ORDER — SODIUM CHLORIDE 0.9 % (FLUSH) 0.9 %
5-40 SYRINGE (ML) INJECTION AS NEEDED
Status: DISCONTINUED | OUTPATIENT
Start: 2019-07-08 | End: 2019-07-08 | Stop reason: HOSPADM

## 2019-07-08 RX ORDER — FLUMAZENIL 0.1 MG/ML
0.2 INJECTION INTRAVENOUS
Status: DISCONTINUED | OUTPATIENT
Start: 2019-07-08 | End: 2019-07-08 | Stop reason: HOSPADM

## 2019-07-08 RX ORDER — SODIUM CHLORIDE 9 MG/ML
INJECTION, SOLUTION INTRAVENOUS
Status: DISCONTINUED | OUTPATIENT
Start: 2019-07-08 | End: 2019-07-08 | Stop reason: HOSPADM

## 2019-07-08 RX ORDER — EPINEPHRINE 0.1 MG/ML
1 INJECTION INTRACARDIAC; INTRAVENOUS
Status: DISCONTINUED | OUTPATIENT
Start: 2019-07-08 | End: 2019-07-08 | Stop reason: HOSPADM

## 2019-07-08 RX ORDER — ATROPINE SULFATE 0.1 MG/ML
0.5 INJECTION INTRAVENOUS
Status: DISCONTINUED | OUTPATIENT
Start: 2019-07-08 | End: 2019-07-08 | Stop reason: HOSPADM

## 2019-07-08 RX ADMIN — PROPOFOL 100 MG: 10 INJECTION, EMULSION INTRAVENOUS at 12:37

## 2019-07-08 RX ADMIN — LIDOCAINE HYDROCHLORIDE 40 MG: 20 INJECTION, SOLUTION EPIDURAL; INFILTRATION; INTRACAUDAL; PERINEURAL at 12:37

## 2019-07-08 RX ADMIN — PROPOFOL 100 MG: 10 INJECTION, EMULSION INTRAVENOUS at 12:43

## 2019-07-08 RX ADMIN — SODIUM CHLORIDE: 9 INJECTION, SOLUTION INTRAVENOUS at 12:34

## 2019-07-08 NOTE — PERIOP NOTES

## 2019-07-08 NOTE — ROUTINE PROCESS
Valeria Jansen 1970 
[id number] Situation: 
Verbal report received from: [facility] Procedure: Procedure(s): 
COLONOSCOPY 
ENDOSCOPIC POLYPECTOMY Background: 
 
Preoperative diagnosis: RECTAL/ANAL HEMORRHAGE Postoperative diagnosis: Polyp :  Dr. Farida Banks Assistant(s): Endoscopy Technician-1: Savi Martino Endoscopy RN-1: Jennifer Mcgee RN Specimens:  
ID Type Source Tests Collected by Time Destination 1 : polyp Preservative Cecum  Wash MD Almaz 7/8/2019 1245 Pathology H. Pylori  no Assessment: 
Intra-procedure medications Anesthesia gave intra-procedure sedation and medications, see anesthesia flow sheet yes Intravenous fluids: NS@ Christina Mino Vital signs stable yes Abdominal assessment: round and soft gordon Recommendation: 
Discharge patient per MD order yes . Return to floor na Family or Friend fam 
Permission to share finding with family or friend yes

## 2019-07-08 NOTE — ANESTHESIA PREPROCEDURE EVALUATION
Relevant Problems   No relevant active problems       Anesthetic History   No history of anesthetic complications            Review of Systems / Medical History  Patient summary reviewed, nursing notes reviewed and pertinent labs reviewed    Pulmonary  Within defined limits                 Neuro/Psych         Psychiatric history     Cardiovascular  Within defined limits                     GI/Hepatic/Renal  Within defined limits              Endo/Other  Within defined limits           Other Findings   Comments: Sarcoidosis           Physical Exam    Airway  Mallampati: II  TM Distance: > 6 cm  Neck ROM: normal range of motion   Mouth opening: Normal     Cardiovascular  Regular rate and rhythm,  S1 and S2 normal,  no murmur, click, rub, or gallop             Dental  No notable dental hx       Pulmonary  Breath sounds clear to auscultation               Abdominal  GI exam deferred       Other Findings            Anesthetic Plan    ASA: 2  Anesthesia type: MAC            Anesthetic plan and risks discussed with: Patient

## 2019-07-08 NOTE — DISCHARGE INSTRUCTIONS
[address] [facility] - [place], [place]    COLON DISCHARGE INSTRUCTIONS    Neetu Urena  [id number]  1970    Discomfort:  Redness at IV site- apply warm compress to area; if redness or soreness persist- contact your physician  There may be a slight amount of blood passed from the rectum  Gaseous discomfort- walking, belching will help relieve any discomfort  You may not operate a vehicle for 12 hours  You may not engage in an occupation involving machinery or appliances for rest of today  You may not drink alcoholic beverages for at least 12 hours  Avoid making any critical decisions for at least 24 hour  DIET:  You may resume your regular diet - however -  remember your colon is empty and a heavy meal will produce gas. Avoid these foods:  vegetables, fried / greasy foods, carbonated drinks     ACTIVITY:  You may  resume your normal daily activities it is recommended that you spend the remainder of the day resting -  avoid any strenuous activity. CALL M.D.   ANY SIGN OF:   Increasing pain, nausea, vomiting  Abdominal distension (swelling)  New increased bleeding (oral or rectal)  Fever (chills)  Pain in chest area  Bloody discharge from nose or mouth  Shortness of breath      Follow-up Instructions:   Call Dr. Vikash Beckman for any questions or problems at [phone] and follow up as needed   High fiber diet          ENDOSCOPY FINDINGS:   Your colonoscopy showed one small polyp, removed and small internal hemorrhoids, rest of exam was normal.  Telephone # [phone]      Signed By: Vikash Beckman MD     7/8/2019  12:51 PM       DISCHARGE SUMMARY from Nurse    The following personal items collected during your admission are returned to you:   Dental Appliance: Dental Appliances: None  Vision:    Hearing Aid:    Jewelry:    Clothing:    Other Valuables:    Valuables sent to safe:

## 2019-07-08 NOTE — PROCEDURES
[address]  [facility], [address]      Colonoscopy Operative Report    Jose Guadalupe Or  [id number]  1970      Procedure Type:   Colonoscopy with polypectomy (hot biopsy)     Indications:    Hematochezia/melena       Pre-operative Diagnosis: see indication above    Post-operative Diagnosis:  See findings below    :  Rufino Richey MD    Surgical Assistant: None    Implants:  None    Referring Provider: Dorian Truong MD      Sedation:  MAC anesthesia Propofol      Procedure Details:  After informed consent was obtained with all risks and benefits of procedure explained and preoperative exam completed, the patient was taken to the endoscopy suite and placed in the left lateral decubitus position. Upon sequential sedation as per above, a digital rectal exam was performed demonstrating internal hemorrhoids. The Olympus videocolonoscope  was inserted in the rectum and carefully advanced to the cecum, which was identified by the ileocecal valve and appendiceal orifice. The cecum was identified by the ileocecal valve and appendiceal orifice. The quality of preparation was good. The colonoscope was slowly withdrawn with careful evaluation between folds. Retroflexion in the rectum was completed . Findings:   Rectum: normal  Grade 1 internal hemorrhoids  Sigmoid: normal  Descending Colon: normal  Transverse Colon: normal  Ascending Colon: normal  Cecum: 9 mm polyp removed by hot biopsy        Specimen Removed:  as above    Complications: None. EBL:  None. Impression:    see findings    Recommendations: --Await pathology.       Recommendation for next colonoscopy in 5 years  High fiber diet    Signed By: Rufino Richey MD     7/8/2019  12:49 PM

## 2019-07-08 NOTE — ANESTHESIA POSTPROCEDURE EVALUATION
Post-Anesthesia Evaluation and Assessment    Patient: Neeru Ness MRN: [id number]  SSN: [id number]    YOB: 1970  Age: 53 y.o. Sex: male      I have evaluated the patient and they are stable and ready for discharge from the PACU. Cardiovascular Function/Vital Signs  Visit Vitals  /74   Pulse 74   Resp 15   Ht 6' 2\" (1.88 m)   Wt 101.6 kg (224 lb)   SpO2 98%   BMI 28.76 kg/m²       Patient is status post MAC anesthesia for Procedure(s):  COLONOSCOPY  ENDOSCOPIC POLYPECTOMY. Nausea/Vomiting: None    Postoperative hydration reviewed and adequate. Pain:  Pain Scale 1: Numeric (0 - 10) (07/08/19 1203)  Pain Intensity 1: 0 (07/08/19 1203)   Managed    Neurological Status: At baseline    Mental Status, Level of Consciousness: Alert and  oriented to person, place, and time    Pulmonary Status:   O2 Device: Nasal cannula (07/08/19 1247)   Adequate oxygenation and airway patent    Complications related to anesthesia: None    Post-anesthesia assessment completed. No concerns    Signed By: Jamilah Mason MD     July 8, 2019              Procedure(s):  COLONOSCOPY  ENDOSCOPIC POLYPECTOMY.     MAC    <BSHSIANPOST>    Vitals Value Taken Time   /74 7/8/2019 12:47 PM   Temp     Pulse 74 7/8/2019 12:47 PM   Resp 15 7/8/2019 12:47 PM   SpO2 98 % 7/8/2019 12:47 PM

## 2019-07-08 NOTE — H&P
The patient is a 53year old male who presents with a complaint of Bloating. The patient presents consultation at the request of Dr. Stanislav Wild). The bloating has been occurring in an intermittent pattern for 1 year. The symptoms have been associated with abdominal pain, while the symptoms have not been associated with amenorrhea, anorexia, anxiety, bloody stools, chewing gum, constipation, diarrhea, drinking large amounts of carbonated beverages, dysmenorrhea, dysuria, hard stools, hematemesis, hematuria, history of abdominal surgery, melena, peripheral edema, poor fitting dentures, possible pregnancy, post-nasal drip, rapid eating, steatorrhea, vaginal bleeding, weight gain or weight loss. Note for \"Bloating\": he was diagnosed to have lung sarcoidosis, c/o intermittent bloating for 1 year, some lower abdominal pain and rectal bleeding, never had colonoscopy done before, no constipation      Problem List/Past Medical (Qiana Perkins; 4/25/2019 9:27 AM)  Sarcoidosis (135  D86.9)  [2016]:    Past Surgical History (Qiana Perkins; 4/25/2019 9:28 AM)  None  [04/25/2019]: Allergies (Qiana Perkins; 4/25/2019 9:27 AM)  No Known Drug Allergies  [04/22/2019]:  No Known Allergies  [04/22/2019]:    Medication History (Qiana Perkins; 4/25/2019 9:28 AM)  No Current Medications   Medications Reconciled     Family History (Qiana Perkins; 4/25/2019 9:27 AM)  Colon Cancer   First Degree Relatives. Social History (Qiana Perkins; 4/25/2019 9:27 AM)  Blood Transfusion   No.  Alcohol Use   Occasional alcohol use, Drinks beer, Drinks hard liquor, Drinks wine. Employment status   Full-time. Marital status   Single. Tobacco Use   Never smoker. Diagnostic Studies History (Qiana Perkins; 4/25/2019 9:29 AM)  None  [04/25/2019]:    Health Maintenance History (Qiana Grubbs; 4/25/2019 9:27 AM)  Flu Vaccine   none  Pneumovax   none        Review of Systems (Qiana Perkins; 4/25/2019 9:28 AM)  General Present- Chronic Fatigue. Not Present- Poor Appetite, Weight Gain and Weight Loss. Skin Not Present- Itching, Rash and Skin Color Changes. HEENT Not Present- Hearing Loss and Vertigo. Respiratory Present- Difficulty Breathing. Not Present- TB exposure. Cardiovascular Not Present- Chest Pain, Use of Antibiotics before Dental Procedures and Use of Blood Thinners. Gastrointestinal Present- See HPI. Musculoskeletal Not Present- Arthritis, Hip Replacement Surgery and Knee Replacement Surgery. Neurological Not Present- Weakness. Psychiatric Not Present- Depression. Endocrine Not Present- Diabetes and Thyroid Problems. Hematology Not Present- Anemia. Vitals (Qiana Ramirez; 4/25/2019 9:28 AM)  4/25/2019 9:27 AM  Weight: 229 lb   Height: 74 in   Body Surface Area: 2.3 m²   Body Mass Index: 29.4 kg/m²    Pulse: 90 (Regular)     BP: 124/85 (Sitting, Left Arm, Standard)              Physical Exam (Monserrat Chung MD; 4/25/2019 10:07 AM)  General  Mental Status - Alert. General Appearance - Cooperative, Pleasant, Not in acute distress. Orientation - Oriented X3. Build & Nutrition - Well nourished and Well developed. Integumentary  General Characteristics  Overall examination of the patient's skin reveals - no rashes, no bruises and no spider angiomas. Color - normal coloration of skin. Head and Neck  Neck  Global Assessment - full range of motion and supple, no bruit auscultated on the right, no bruit auscultated on the left, non-tender, no lymphadenopathy. Thyroid  Gland Characteristics - normal size and consistency. Eye  Eyeball - Left - No Exophthalmos. Eyeball - Right - No Exophthalmos. Sclera/Conjunctiva - Left - No Jaundice. Sclera/Conjunctiva - Right - No Jaundice. Chest and Lung Exam  Chest and lung exam reveals  - quiet, even and easy respiratory effort with no use of accessory muscles.   Auscultation  Breath sounds - Normal. Adventitious sounds - No Adventitious sounds. Cardiovascular  Auscultation  Rhythm - Regular, No Tachycardia, No Bradycardia . Heart Sounds - Normal heart sounds , S1 WNL and S2 WNL, No S3, No Summation Gallop. Murmurs & Other Heart Sounds - Auscultation of the heart reveals - No Murmurs. Abdomen  Palpation/Percussion  Tenderness - Non-Tender. Rebound tenderness - No rebound. Rigidity (guarding) - No Rigidity. Dullness to percussion - No abnormal dullness to percussion. Liver - No hepatosplenomegaly. Abdominal Mass Palpable - No masses. Other Characteristics - No Ascites. Auscultation  Auscultation of the abdomen reveals - Bowel sounds normal, No Abdominal bruits and No Succussion splash. Rectal - Did not examine. Peripheral Vascular  Upper Extremity  Inspection - Left - Normal - No Clubbing, No Cyanosis, No Edema, Pulses Intact. Right - Normal - No Clubbing, No Cyanosis, No Edema, Pulses Intact. Palpation - Edema - Left - No edema. Right - No edema. Lower Extremity  Inspection - Left - Inspection Normal. Right - Inspection Normal. Palpation - Edema - Left - No edema. Right - No edema. Neurologic  Neurologic evaluation reveals  - Cranial nerves grossly intact, no focal neurologic deficits. Motor  Involuntary Movements - Asterixis - not present. Musculoskeletal  Global Assessment  Gait and Station - normal gait and station. Assessment & Plan (Bree Solis MD; 4/25/2019 10:09 AM)  Bloating (787.3  R14.0)  Impression: he was diagnosed to have lung sarcoidosis, c/o intermittent bloating for 1 year, some lower abdominal pain and rectal bleeding, never had colonoscopy done before, no constipation  trial of low FOADMAP diet and daily align for 2 months  Current Plans  CELIAC DISEASE, COMP (87010)  Pt Education - How to access health information online: discussed with patient and provided information.   Sarcoidosis (135  D86.9)  Rectal/anal hemorrhage (569.3  K62.5)  Impression: colonoscopy to look for any colonic neoplasm or hemorrhoids  Current Plans  Colonoscopy (93515) (Discussed risks and benefits with the patient to include:; perforation, post polypectomy, or post biopsy bleeding, missed lesions, and sedation reactions.)  Started Suprep Bowel Prep Kit 17.5-3.13-1.6GM/177ML, 1 (one) KIt container Milliliter as directed, 354 Milliliter, 1 day starting 04/25/2019, No Refill. Abdominal pain (789.00  R10.9)  Lower abdominal pain (789.09  R10.30)  Date of Surgery Update:  Jose Guadalupe Mcelroy was seen and examined. History and physical has been reviewed. The patient has been examined.  There have been no significant clinical changes since the completion of the originally dated History and Physical.    Signed By: Rufino Richey MD     July 8, 2019 12:31 PM

## 2019-09-23 PROBLEM — Z00.00 WELL ADULT EXAM: Status: RESOLVED | Noted: 2018-11-02 | Resolved: 2019-09-23

## 2019-11-04 ENCOUNTER — TELEPHONE (OUTPATIENT)
Dept: INTERNAL MEDICINE CLINIC | Age: 49
End: 2019-11-04

## 2019-11-04 NOTE — TELEPHONE ENCOUNTER
Return call made with patient. Patient states that he will contact MD who orginally prescribed prednisone.

## 2019-11-15 ENCOUNTER — HOSPITAL ENCOUNTER (OUTPATIENT)
Dept: GENERAL RADIOLOGY | Age: 49
Discharge: HOME | End: 2019-11-15
Attending: INTERNAL MEDICINE
Payer: MEDICAID

## 2019-11-15 DIAGNOSIS — R06.02 SHORTNESS OF BREATH: ICD-10-CM

## 2019-11-15 PROCEDURE — 71046 X-RAY EXAM CHEST 2 VIEWS: CPT

## 2019-12-06 ENCOUNTER — HOSPITAL ENCOUNTER (OUTPATIENT)
Dept: CT IMAGING | Age: 49
Discharge: HOME | End: 2019-12-06
Attending: INTERNAL MEDICINE
Payer: MEDICAID

## 2019-12-06 DIAGNOSIS — D86.9 SARCOIDOSIS: ICD-10-CM

## 2019-12-06 PROCEDURE — 71250 CT THORAX DX C-: CPT

## 2020-03-17 ENCOUNTER — OFFICE VISIT (OUTPATIENT)
Dept: INTERNAL MEDICINE CLINIC | Age: 50
End: 2020-03-17

## 2020-03-17 VITALS
TEMPERATURE: 98.5 F | RESPIRATION RATE: 18 BRPM | HEART RATE: 81 BPM | DIASTOLIC BLOOD PRESSURE: 91 MMHG | SYSTOLIC BLOOD PRESSURE: 143 MMHG | HEIGHT: 74 IN | BODY MASS INDEX: 28.27 KG/M2 | WEIGHT: 220.3 LBS | OXYGEN SATURATION: 93 %

## 2020-03-17 DIAGNOSIS — E78.00 PURE HYPERCHOLESTEROLEMIA: ICD-10-CM

## 2020-03-17 DIAGNOSIS — G44.229 CHRONIC TENSION-TYPE HEADACHE, NOT INTRACTABLE: ICD-10-CM

## 2020-03-17 DIAGNOSIS — R91.1 PULMONARY NODULE: ICD-10-CM

## 2020-03-17 DIAGNOSIS — D86.0 PULMONARY SARCOIDOSIS (HCC): ICD-10-CM

## 2020-03-17 DIAGNOSIS — F90.9 ATTENTION DEFICIT HYPERACTIVITY DISORDER (ADHD), UNSPECIFIED ADHD TYPE: Primary | ICD-10-CM

## 2020-03-17 NOTE — PATIENT INSTRUCTIONS
Body Mass Index: Care Instructions Your Care Instructions Body mass index (BMI) can help you see if your weight is raising your risk for health problems. It uses a formula to compare how much you weigh with how tall you are. · A BMI lower than 18.5 is considered underweight. · A BMI between 18.5 and 24.9 is considered healthy. · A BMI between 25 and 29.9 is considered overweight. A BMI of 30 or higher is considered obese. If your BMI is in the normal range, it means that you have a lower risk for weight-related health problems. If your BMI is in the overweight or obese range, you may be at increased risk for weight-related health problems, such as high blood pressure, heart disease, stroke, arthritis or joint pain, and diabetes. If your BMI is in the underweight range, you may be at increased risk for health problems such as fatigue, lower protection (immunity) against illness, muscle loss, bone loss, hair loss, and hormone problems. BMI is just one measure of your risk for weight-related health problems. You may be at higher risk for health problems if you are not active, you eat an unhealthy diet, or you drink too much alcohol or use tobacco products. Follow-up care is a key part of your treatment and safety. Be sure to make and go to all appointments, and call your doctor if you are having problems. It's also a good idea to know your test results and keep a list of the medicines you take. How can you care for yourself at home? · Practice healthy eating habits. This includes eating plenty of fruits, vegetables, whole grains, lean protein, and low-fat dairy. · If your doctor recommends it, get more exercise. Walking is a good choice. Bit by bit, increase the amount you walk every day. Try for at least 30 minutes on most days of the week. · Do not smoke. Smoking can increase your risk for health problems.  If you need help quitting, talk to your doctor about stop-smoking programs and medicines. These can increase your chances of quitting for good. · Limit alcohol to 2 drinks a day for men and 1 drink a day for women. Too much alcohol can cause health problems. If you have a BMI higher than 25 · Your doctor may do other tests to check your risk for weight-related health problems. This may include measuring the distance around your waist. A waist measurement of more than 40 inches in men or 35 inches in women can increase the risk of weight-related health problems. · Talk with your doctor about steps you can take to stay healthy or improve your health. You may need to make lifestyle changes to lose weight and stay healthy, such as changing your diet and getting regular exercise. If you have a BMI lower than 18.5 · Your doctor may do other tests to check your risk for health problems. · Talk with your doctor about steps you can take to stay healthy or improve your health. You may need to make lifestyle changes to gain or maintain weight and stay healthy, such as getting more healthy foods in your diet and doing exercises to build muscle. Where can you learn more? Go to http://ramon-josue.info/. Enter S176 in the search box to learn more about \"Body Mass Index: Care Instructions. \" Current as of: October 13, 2016 Content Version: 11.4 © 1587-3448 [employer]. Care instructions adapted under license by [employer] (which disclaims liability or warranty for this information). If you have questions about a medical condition or this instruction, always ask your healthcare professional. [address] any warranty or liability for your use of this information.

## 2020-03-17 NOTE — PROGRESS NOTES
SPORTS MEDICINE AND PRIMARY CARE  John Johnson MD, [address] [address]  Phone:  [phone]  Fax: [phone]       Chief Complaint   Patient presents with    Dry Skin   . SUBJECTIVE:    Eduarda Herzog is a 51 y.o. Red Gonzales returns today with a known history of pulmonary sarcoidosis, ADHD, dyslipidemia, eczema, interstitial lung disease, pulmonary nodule, and is seen for evaluation. The last chest CT was 12/06/19, which revealed severe lung disease with a predominant abnormality of diffuse areas of consolidation, bronchograms, but there were reticular opacities. , bronchovascular distribution and the perilymphatic distribution, several other nodules were subpleural in location, but pseudo plaque-like. .  The findings were consistent with his  known sarcoidosis. Patient is seen for evaluation, wants to be checked for STDs. Current Outpatient Medications   Medication Sig Dispense Refill    OTHER Herbal total body immunization. Takes 3 capsules po once daily.  dextroamphetamine-amphetamine (ADDERALL) 10 mg tablet Take 1 Tab by mouth daily. Max Daily Amount: 10 mg. 30 Tab 0    triamcinolone acetonide (KENALOG) 0.1 % ointment Apply  to affected area two (2) times a day. use thin layer 80 g 11    Selenium Sulfide 2.25 % topical foam Apply  to affected area every Monday and Thursday. 1 Can 11    albuterol (PROVENTIL HFA, VENTOLIN HFA, PROAIR HFA) 90 mcg/actuation inhaler Take 2 Puffs by inhalation every four (4) hours as needed for Wheezing.  1 Inhaler 6     Past Medical History:   Diagnosis Date    ADHD     Eczema     Eosinophilia     Folliculitis     ILD (interstitial lung disease) ([address]     Interstitial lung disease ([address]     Plantar fasciitis, bilateral 11/02/2018    Pulmonary lesion     sarcoidosis    S/P colonoscopic polypectomy 07/08/2019    Asif Dunn MD - repeat 5 yrs    Sarcoidosis 2013     Past Surgical History:   Procedure Laterality Date    CHEST SURGERY PROCEDURE UNLISTED      bronchoscopy    COLONOSCOPY N/A 7/8/2019    [facility]COPY    HX HEENT      wisdom teeth     No Known Allergies      REVIEW OF SYSTEMS:  General: negative for - chills or fever  ENT: negative for - headaches, nasal congestion or tinnitus  Respiratory: negative for - cough, hemoptysis, shortness of breath or wheezing  Cardiovascular : negative for - chest pain, edema, palpitations or shortness of breath  Gastrointestinal: negative for - abdominal pain, blood in stools, heartburn or nausea/vomiting  Genito-Urinary: no dysuria, trouble voiding, or hematuria  Musculoskeletal: negative for - gait disturbance, joint pain, joint stiffness or joint swelling  Neurological: no TIA or stroke symptoms  Hematologic: no bruises, no bleeding, no swollen glands  Integument: no lumps, mole changes, nail changes or rash  Endocrine: no malaise/lethargy or unexpected weight changes      Social History     Socioeconomic History    Marital status: SINGLE     Spouse name: Not on file    Number of children: Not on file    Years of education: Not on file    Highest education level: Not on file   Tobacco Use    Smoking status: Former Smoker     Packs/day: 0.25     Years: 16.00     Pack years: 4.00     Types: Cigarettes     Start date: 3/6/2000    Smokeless tobacco: Never Used    Tobacco comment: quit 2016   Substance and Sexual Activity    Alcohol use: Yes     Alcohol/week: 0.0 standard drinks     Comment: occasional    Drug use: No    Sexual activity: Yes     Partners: Female     Birth control/protection: Condom   Social History Narrative    Habits:  he discontinued cigarette abuse 2016. He has occasional weed. He drinks maybe a glass of wine a week. No substance abuse.         Social History:  The patient is single. He is the father of a son 16years old. He completed his bachelor's degree at [employer].   Did not complete his masters degree, but started working on it. He is self employed. He has no Christian preference.         Family History:  Father  39 of unknown cause. He wondered if his dad was poisoned, but he was only [de-identified] years old at the time. Mother is 61. She has had a brain tumor. She also has thyroid disease. Half brother, half sister alive and well. Family History   Problem Relation Age of Onset   Jessenia Hilton Arthritis-osteo Mother     Asthma Mother     No Known Problems Father     Migraines Sister     No Known Problems Sister        OBJECTIVE:    Visit Vitals  BP (!) 143/91   Pulse 81   Temp 98.5 °F (36.9 °C) (Oral)   Resp 18   Ht 6' 2\" (1.88 m)   Wt 220 lb 4.8 oz (99.9 kg)   SpO2 93%   BMI 28.28 kg/m²     CONSTITUTIONAL: well , well nourished, appears age appropriate  EYES: perrla, eom intact  ENMT:moist mucous membranes, pharynx clear  NECK: supple. Thyroid normal  RESPIRATORY: Chest: clear bilaterally   CARDIOVASCULAR: Heart: regular rate and rhythm  GASTROINTESTINAL: Abdomen: soft, bowel sounds active  HEMATOLOGIC: no pathological lymph nodes palpated  MUSCULOSKELETAL: Extremities: no edema, pulse 1+   INTEGUMENT: No unusual rashes or suspicious skin lesions noted. Nails appear normal.  NEUROLOGIC: non-focal exam   MENTAL STATUS: alert and oriented, appropriate affect           ASSESSMENT:  1. Attention deficit hyperactivity disorder (ADHD), unspecified ADHD type    2. Chronic tension-type headache, not intractable    3. Pulmonary sarcoidosis ([address]    4. Pure hypercholesterolemia    5. Pulmonary nodule      ADHD is stable. He is not taking medications currently. Headaches are resolved. Pulmonary sarcoidosis is followed by Dr. Susu Cline and we remind him that he should keep his follow op appointments on a regular basis because of the severity of his pulmonary involvement with sarcoid. He has a history of dyslipidemia, for which we will check his cholesterol.     The pulmonary nodule is associated with sarcoid and no further investigation as he is followed by pulmonologist.    We do encourage physical activity 30 minutes five days a week and a heart healthy diet. He was on a plant based diet and he felt better. He will be back to see us in one year, sooner if he has any problems. He is requesting STD screening. Discussed the patient's BMI with him. The BMI follow up plan is as follows:     dietary management education, guidance, and counseling  encourage exercise  monitor weight  prescribed dietary intake    An After Visit Summary was printed and given to the patient  I have discussed the diagnosis with the patient and the intended plan as seen in the  orders above. The patient understands and agees with the plan. The patient has   received an after visit summary and questions were answered concerning  future plans  Patient labs and/or xrays were reviewed  Past records were reviewed. PLAN:  .  Orders Placed This Encounter    HEMOGLOBIN A1C WITH EAG    URINALYSIS W/ RFLX MICROSCOPIC    CBC WITH AUTOMATED DIFF    METABOLIC PANEL, COMPREHENSIVE    LIPID PANEL    PROSTATE SPECIFIC AG    HEPATITIS PANEL, ACUTE    HIV 1/2 AG/AB, 4TH GENERATION,W RFLX CONFIRM    RPR       Follow-up and Dispositions    · Return in about 1 year (around 3/17/2021). ATTENTION:   This medical record was transcribed using an electronic medical records system. Although proofread, it may and can contain electronic and spelling errors. Other human spelling and other errors may be present. Corrections may be executed at a later time. Please feel free to contact us for any clarifications as needed. Robert Gutierrez

## 2020-03-17 NOTE — ASSESSMENT & PLAN NOTE
Stable, based on history, physical exam and review of pertinent labs, studies and medications; meds reconciled; continue current treatment plan. Key COPD Medications             albuterol (PROVENTIL HFA, VENTOLIN HFA, PROAIR HFA) 90 mcg/actuation inhaler (Taking) Take 2 Puffs by inhalation every four (4) hours as needed for Wheezing.         Lab Results   Component Value Date/Time    WBC 7.4 11/02/2018 01:22 PM    HGB 14.3 11/02/2018 01:22 PM    HCT 44.7 11/02/2018 01:22 PM    PLATELET 311 01/00/4202 01:22 PM

## 2020-03-17 NOTE — PROGRESS NOTES
1. Have you been to the ER, urgent care clinic since your last visit? Hospitalized since your last visit? No    2. Have you seen or consulted any other health care providers outside of the [address] [place] [address] since your last visit? Include any pap smears or colon screening.  No     Wants std testing HIV testing

## 2020-03-19 LAB
ALBUMIN SERPL-MCNC: 4.7 G/DL (ref 4–5)
ALBUMIN/GLOB SERPL: 1.4 {RATIO} (ref 1.2–2.2)
ALP SERPL-CCNC: 77 IU/L (ref 39–117)
ALT SERPL-CCNC: 16 IU/L (ref 0–44)
APPEARANCE UR: CLEAR
AST SERPL-CCNC: 14 IU/L (ref 0–40)
BASOPHILS # BLD AUTO: 0.1 X10E3/UL (ref 0–0.2)
BASOPHILS NFR BLD AUTO: 2 %
BILIRUB SERPL-MCNC: 0.4 MG/DL (ref 0–1.2)
BILIRUB UR QL STRIP: NEGATIVE
BUN SERPL-MCNC: 12 MG/DL (ref 6–24)
BUN/CREAT SERPL: 11 (ref 9–20)
CALCIUM SERPL-MCNC: 10.4 MG/DL (ref 8.7–10.2)
CHLORIDE SERPL-SCNC: 99 MMOL/L (ref 96–106)
CHOLEST SERPL-MCNC: 220 MG/DL (ref 100–199)
CO2 SERPL-SCNC: 25 MMOL/L (ref 20–29)
COLOR UR: YELLOW
CREAT SERPL-MCNC: 1.07 MG/DL (ref 0.76–1.27)
EOSINOPHIL # BLD AUTO: 0.2 X10E3/UL (ref 0–0.4)
EOSINOPHIL NFR BLD AUTO: 3 %
ERYTHROCYTE [DISTWIDTH] IN BLOOD BY AUTOMATED COUNT: 15.1 % (ref 11.6–15.4)
EST. AVERAGE GLUCOSE BLD GHB EST-MCNC: 131 MG/DL
GLOBULIN SER CALC-MCNC: 3.3 G/DL (ref 1.5–4.5)
GLUCOSE SERPL-MCNC: 80 MG/DL (ref 65–99)
GLUCOSE UR QL: NEGATIVE
HAV IGM SERPL QL IA: NEGATIVE
HBA1C MFR BLD: 6.2 % (ref 4.8–5.6)
HBV CORE IGM SERPL QL IA: NEGATIVE
HBV SURFACE AG SERPL QL IA: NEGATIVE
HCT VFR BLD AUTO: 47.9 % (ref 37.5–51)
HCV AB S/CO SERPL IA: <0.1 S/CO RATIO (ref 0–0.9)
HDLC SERPL-MCNC: 52 MG/DL
HGB BLD-MCNC: 14.9 G/DL (ref 13–17.7)
HGB UR QL STRIP: NEGATIVE
HIV 1+2 AB+HIV1 P24 AG SERPL QL IA: NON REACTIVE
IMM GRANULOCYTES # BLD AUTO: 0 X10E3/UL (ref 0–0.1)
IMM GRANULOCYTES NFR BLD AUTO: 0 %
KETONES UR QL STRIP: ABNORMAL
LDLC SERPL CALC-MCNC: 152 MG/DL (ref 0–99)
LEUKOCYTE ESTERASE UR QL STRIP: NEGATIVE
LYMPHOCYTES # BLD AUTO: 1 X10E3/UL (ref 0.7–3.1)
LYMPHOCYTES NFR BLD AUTO: 18 %
MCH RBC QN AUTO: 24.5 PG (ref 26.6–33)
MCHC RBC AUTO-ENTMCNC: 31.1 G/DL (ref 31.5–35.7)
MCV RBC AUTO: 79 FL (ref 79–97)
MICRO URNS: ABNORMAL
MONOCYTES # BLD AUTO: 0.5 X10E3/UL (ref 0.1–0.9)
MONOCYTES NFR BLD AUTO: 9 %
NEUTROPHILS # BLD AUTO: 3.7 X10E3/UL (ref 1.4–7)
NEUTROPHILS NFR BLD AUTO: 68 %
NITRITE UR QL STRIP: NEGATIVE
PH UR STRIP: 5.5 [PH] (ref 5–7.5)
PLATELET # BLD AUTO: 179 X10E3/UL (ref 150–450)
POTASSIUM SERPL-SCNC: 4.6 MMOL/L (ref 3.5–5.2)
PROT SERPL-MCNC: 8 G/DL (ref 6–8.5)
PROT UR QL STRIP: NEGATIVE
PSA SERPL-MCNC: 1.7 NG/ML (ref 0–4)
RBC # BLD AUTO: 6.08 X10E6/UL (ref 4.14–5.8)
RPR SER QL: NON REACTIVE
SODIUM SERPL-SCNC: 142 MMOL/L (ref 134–144)
SP GR UR: >=1.03 (ref 1–1.03)
TRIGL SERPL-MCNC: 81 MG/DL (ref 0–149)
UROBILINOGEN UR STRIP-MCNC: 0.2 MG/DL (ref 0.2–1)
VLDLC SERPL CALC-MCNC: 16 MG/DL (ref 5–40)
WBC # BLD AUTO: 5.5 X10E3/UL (ref 3.4–10.8)

## 2020-05-23 DIAGNOSIS — L30.9 DERMATITIS: ICD-10-CM

## 2020-05-23 RX ORDER — TRIAMCINOLONE ACETONIDE 1 MG/G
OINTMENT TOPICAL 2 TIMES DAILY
Qty: 80 G | Refills: 11 | Status: SHIPPED | OUTPATIENT
Start: 2020-05-23 | End: 2022-06-30 | Stop reason: SDUPTHER

## 2021-01-19 ENCOUNTER — OFFICE VISIT (OUTPATIENT)
Dept: INTERNAL MEDICINE CLINIC | Age: 51
End: 2021-01-19
Payer: MEDICAID

## 2021-01-19 VITALS
OXYGEN SATURATION: 97 % | TEMPERATURE: 98 F | BODY MASS INDEX: 28.43 KG/M2 | SYSTOLIC BLOOD PRESSURE: 130 MMHG | HEART RATE: 75 BPM | DIASTOLIC BLOOD PRESSURE: 87 MMHG | HEIGHT: 74 IN | WEIGHT: 221.5 LBS | RESPIRATION RATE: 18 BRPM

## 2021-01-19 DIAGNOSIS — J84.9 ILD (INTERSTITIAL LUNG DISEASE) (HCC): ICD-10-CM

## 2021-01-19 DIAGNOSIS — R73.03 PREDIABETES: ICD-10-CM

## 2021-01-19 DIAGNOSIS — F17.200 SMOKER: ICD-10-CM

## 2021-01-19 DIAGNOSIS — F98.8 ATTENTION DEFICIT DISORDER, UNSPECIFIED HYPERACTIVITY PRESENCE: ICD-10-CM

## 2021-01-19 DIAGNOSIS — F52.4 PREMATURE EJACULATION: ICD-10-CM

## 2021-01-19 DIAGNOSIS — M79.604 LEG PAIN, BILATERAL: Primary | ICD-10-CM

## 2021-01-19 DIAGNOSIS — E78.00 PURE HYPERCHOLESTEROLEMIA: ICD-10-CM

## 2021-01-19 DIAGNOSIS — M79.605 LEG PAIN, BILATERAL: Primary | ICD-10-CM

## 2021-01-19 DIAGNOSIS — D86.0 PULMONARY SARCOIDOSIS (HCC): ICD-10-CM

## 2021-01-19 DIAGNOSIS — L30.9 ECZEMA, UNSPECIFIED TYPE: ICD-10-CM

## 2021-01-19 PROCEDURE — 99214 OFFICE O/P EST MOD 30 MIN: CPT | Performed by: INTERNAL MEDICINE

## 2021-01-19 RX ORDER — SILDENAFIL 100 MG/1
100 TABLET, FILM COATED ORAL AS NEEDED
Qty: 10 TAB | Refills: 11 | Status: SHIPPED | OUTPATIENT
Start: 2021-01-19 | End: 2022-06-30 | Stop reason: SDUPTHER

## 2021-01-19 NOTE — PROGRESS NOTES
SPORTS MEDICINE AND PRIMARY CARE  Osbaldo Wong MD, [address] [address]  Phone:  [phone]  Fax: [phone]       Chief Complaint   Patient presents with    Leg Pain   . SUBJECTIVE:    Shahrzad Villegas is a 53 y.o. male Patient returns today with a known history of pulmonary sarcoid, cigarette abuse, interstitial lung disease, ADD, dyslipidemia, prediabetes, eczema, and bilateral leg pain. Patient comes in today complaining of leg pains bilaterally for the past two weeks. They are shooting pains and occur between the hip and the knee. They involve the anterior, posterior and lateral aspects of the leg. Patient states it gets worse when he walks. Patient is seen for evaluation. Current Outpatient Medications   Medication Sig Dispense Refill    sildenafil citrate (VIAGRA) 100 mg tablet Take 1 Tab by mouth as needed for Erectile Dysfunction. 10 Tab 11    triamcinolone acetonide (KENALOG) 0.1 % ointment APPLY TO AFFECTED AREA TWO (2) TIMES A DAY. USE THIN LAYER 80 g 11    OTHER Herbal total body immunization. Takes 3 capsules po once daily.  dextroamphetamine-amphetamine (ADDERALL) 10 mg tablet Take 1 Tab by mouth daily. Max Daily Amount: 10 mg. 30 Tab 0    Selenium Sulfide 2.25 % topical foam Apply  to affected area every Monday and Thursday. 1 Can 11    albuterol (PROVENTIL HFA, VENTOLIN HFA, PROAIR HFA) 90 mcg/actuation inhaler Take 2 Puffs by inhalation every four (4) hours as needed for Wheezing.  1 Inhaler 6     Past Medical History:   Diagnosis Date    ADHD     Eczema     Eosinophilia     Folliculitis     ILD (interstitial lung disease) (HCC)     Interstitial lung disease (HCC)     Leg pain, bilateral 01/19/2021    Plantar fasciitis, bilateral 11/02/2018    Pulmonary lesion     sarcoidosis    S/P colonoscopic polypectomy 07/08/2019    Laureano Perkins MD - repeat 5 yrs    Sarcoidosis 2013     Past Surgical History:   Procedure Laterality Date    COLONOSCOPY N/A 7/8/2019    COLON[facility]elena [facility] at [facility] ENDOSCOPY    HX HEENT      wisdom teeth    ND CHEST SURGERY PROCEDURE UNLISTED      bronchoscopy     No Known Allergies      REVIEW OF SYSTEMS:  General: negative for - chills or fever  ENT: negative for - headaches, nasal congestion or tinnitus  Respiratory: negative for - cough, hemoptysis, shortness of breath or wheezing  Cardiovascular : negative for - chest pain, edema, palpitations or shortness of breath  Gastrointestinal: negative for - abdominal pain, blood in stools, heartburn or nausea/vomiting  Genito-Urinary: no dysuria, trouble voiding, or hematuria  Musculoskeletal: negative for - gait disturbance, joint pain, joint stiffness or joint swelling  Neurological: no TIA or stroke symptoms  Hematologic: no bruises, no bleeding, no swollen glands  Integument: no lumps, mole changes, nail changes or rash  Endocrine: no malaise/lethargy or unexpected weight changes      Social History     Socioeconomic History    Marital status: SINGLE     Spouse name: Not on file    Number of children: Not on file    Years of education: Not on file    Highest education level: Not on file   Tobacco Use    Smoking status: Former Smoker     Packs/day: 0.25     Years: 16.00     Pack years: 4.00     Types: Cigarettes     Start date: 3/6/2000    Smokeless tobacco: Never Used    Tobacco comment: quit 2016   Substance and Sexual Activity    Alcohol use: Yes     Alcohol/week: 0.0 standard drinks     Frequency: 2-4 times a month     Drinks per session: 3 or 4     Binge frequency: Less than monthly     Comment: occasional    Drug use: Yes     Types: Marijuana    Sexual activity: Yes     Partners: Female     Birth control/protection: Condom   Social History Narrative    Habits:  he discontinued cigarette abuse 2016. He has occasional weed. He drinks maybe a glass of wine a week.   No substance abuse.         Social History: The patient is single. He is the father of a son 16years old. He completed his bachelor's degree at [employer]. Did not complete his masters degree, but started working on it. He is self employed. He has no Gnosticism preference.         Family History:  Father  39 of unknown cause. He wondered if his dad was poisoned, but he was only [de-identified] years old at the time. Mother is 61. She has had a brain tumor. She also has thyroid disease. Half brother, half sister alive and well. Family History   Problem Relation Age of Onset   [facility] Arthritis-osteo Mother     Asthma Mother     No Known Problems Father     Migraines Sister     No Known Problems Sister        OBJECTIVE:    Visit Vitals  /87   Pulse 75   Temp 98 °F (36.7 °C) (Oral)   Resp 18   Ht 6' 2\" (1.88 m)   Wt 221 lb 8 oz (100.5 kg)   SpO2 97%   BMI 28.44 kg/m²     CONSTITUTIONAL: well , well nourished, appears age appropriate  EYES: perrla, eom intact  ENMT:moist mucous membranes, pharynx clear  NECK: supple. Thyroid normal  RESPIRATORY: Chest: clear bilaterally   CARDIOVASCULAR: Heart: regular rate and rhythm  GASTROINTESTINAL: Abdomen: soft, bowel sounds active  HEMATOLOGIC: no pathological lymph nodes palpated  MUSCULOSKELETAL: Extremities: no edema, pulse 1+   INTEGUMENT: No unusual rashes or suspicious skin lesions noted. Nails appear normal.  NEUROLOGIC: non-focal exam   MENTAL STATUS: alert and oriented, appropriate affect           ASSESSMENT:  1. Leg pain, bilateral    2. Pulmonary sarcoidosis (HCC)    3. Eczema, unspecified type    4. Prediabetes    5. Pure hypercholesterolemia    6. Attention deficit disorder, unspecified hyperactivity presence    7. Smoker    8. ILD (interstitial lung disease) ([facility]    9. Premature ejaculation      We do not have an etiology for the leg pain. He has excellent pulses bilaterally, both DP and PT. There is no pain on palpation. Straight leg raise is negative.   There is no discomfort on stressing the hamstrings. We will ask for an aldolase, CK and GABRIELE, as well as an ACE level. We know that sarcoid can sometimes cause muscle pains and we will see if the ACE level is elevated, which could be a possibility. Eczema seems to be stable. History of prediabetes, which we will check in March. We will also check his dyslipidemia in March. ADD is followed by psychology. He will be back to see us in March. Complains of premature ejaculation and wants to see a urologist.  We made suggestions, but his preference is to see a urologist and he is also going to try Viagra. I have discussed the diagnosis with the patient and the intended plan as seen in the  Orders. The patient understands and agees with the plan. The patient has   received an after visit summary and questions were answered concerning  future plans  Patient labs and/or xrays were reviewed  Past records were reviewed. PLAN:  .  Orders Placed This Encounter    CK    ALDOLASE    GABRIELE, DIRECT, W/REFLEX    ANGIOTENSIN CONVERTING ENZYME    REFERRAL TO UROLOGY    sildenafil citrate (VIAGRA) 100 mg tablet       Follow-up and Dispositions    · Return in about 2 months (around 3/19/2021). ATTENTION:   This medical record was transcribed using an electronic medical records system. Although proofread, it may and can contain electronic and spelling errors. Other human spelling and other errors may be present. Corrections may be executed at a later time. Please feel free to contact us for any clarifications as needed.

## 2021-01-19 NOTE — PROGRESS NOTES
1. Have you been to the ER, urgent care clinic since your last visit? Hospitalized since your last visit? No    2. Have you seen or consulted any other health care providers outside of the [address] [place] [address] since your last visit? Include any pap smears or colon screening.  No     Wants to discuss left and right leg pain

## 2021-01-21 LAB
ACE SERPL-CCNC: 61 U/L (ref 14–82)
ALDOLASE SERPL-CCNC: 7 U/L (ref 3.3–10.3)
ANA SER QL: NEGATIVE
CK SERPL-CCNC: 266 U/L (ref 39–308)

## 2021-06-04 ENCOUNTER — OFFICE VISIT (OUTPATIENT)
Dept: INTERNAL MEDICINE CLINIC | Age: 51
End: 2021-06-04
Payer: MEDICAID

## 2021-06-04 VITALS
TEMPERATURE: 98 F | RESPIRATION RATE: 18 BRPM | BODY MASS INDEX: 28.18 KG/M2 | HEART RATE: 71 BPM | SYSTOLIC BLOOD PRESSURE: 115 MMHG | DIASTOLIC BLOOD PRESSURE: 72 MMHG | OXYGEN SATURATION: 97 % | WEIGHT: 219.6 LBS | HEIGHT: 74 IN

## 2021-06-04 DIAGNOSIS — J84.9 ILD (INTERSTITIAL LUNG DISEASE) (HCC): Primary | ICD-10-CM

## 2021-06-04 DIAGNOSIS — K42.9 UMBILICAL HERNIA WITHOUT OBSTRUCTION AND WITHOUT GANGRENE: ICD-10-CM

## 2021-06-04 DIAGNOSIS — D86.0 PULMONARY SARCOIDOSIS (HCC): ICD-10-CM

## 2021-06-04 DIAGNOSIS — F17.200 SMOKER: ICD-10-CM

## 2021-06-04 DIAGNOSIS — F98.8 ATTENTION DEFICIT DISORDER, UNSPECIFIED HYPERACTIVITY PRESENCE: ICD-10-CM

## 2021-06-04 DIAGNOSIS — R73.02 IGT (IMPAIRED GLUCOSE TOLERANCE): ICD-10-CM

## 2021-06-04 DIAGNOSIS — Z20.2 STD EXPOSURE: ICD-10-CM

## 2021-06-04 DIAGNOSIS — F90.9 ATTENTION DEFICIT HYPERACTIVITY DISORDER (ADHD), UNSPECIFIED ADHD TYPE: ICD-10-CM

## 2021-06-04 PROCEDURE — 99214 OFFICE O/P EST MOD 30 MIN: CPT | Performed by: INTERNAL MEDICINE

## 2021-06-04 NOTE — PROGRESS NOTES
SPORTS MEDICINE AND PRIMARY CARE  Dianna Rice MD, [address] [address]  Phone:  [phone]  Fax: [phone]       Chief Complaint   Patient presents with    Annual Exam   .      SUBJECTIVE:    Cathryn Nation is a 53 y.o. male The patient returns today with known history of interstitial lung disease, sarcoidosis, cigarette abuse, ADD, impaired glucose tolerance, ADHD, STD exposure and is seen for evaluation. The patient comes in today having had STD contact and wants to be tested. He also complains of umbilical cord poking out. Other than that, he states, \"I am doing good. \" The patient is seen for evaluation. Current Outpatient Medications   Medication Sig Dispense Refill    sildenafil citrate (VIAGRA) 100 mg tablet Take 1 Tab by mouth as needed for Erectile Dysfunction. 10 Tab 11    triamcinolone acetonide (KENALOG) 0.1 % ointment APPLY TO AFFECTED AREA TWO (2) TIMES A DAY. USE THIN LAYER 80 g 11    OTHER Herbal total body immunization. Takes 3 capsules po once daily.  dextroamphetamine-amphetamine (ADDERALL) 10 mg tablet Take 1 Tab by mouth daily. Max Daily Amount: 10 mg. 30 Tab 0    Selenium Sulfide 2.25 % topical foam Apply  to affected area every Monday and Thursday. 1 Can 11    albuterol (PROVENTIL HFA, VENTOLIN HFA, PROAIR HFA) 90 mcg/actuation inhaler Take 2 Puffs by inhalation every four (4) hours as needed for Wheezing.  1 Inhaler 6     Past Medical History:   Diagnosis Date    ADHD     Eczema     Eosinophilia     Folliculitis     ILD (interstitial lung disease) (HCC)     Interstitial lung disease (HCC)     Leg pain, bilateral 01/19/2021    Plantar fasciitis, bilateral 11/02/2018    Pulmonary lesion     sarcoidosis    S/P colonoscopic polypectomy 07/08/2019    Abraham Thurston MD - repeat 5 yrs    Sarcoidosis 9359    Umbilical hernia without obstruction and without gangrene 06/04/2021     Past Surgical History:   Procedure Laterality Date    COLONOSCOPY N/A 7/8/2019    COLONOSCOPY performed by Shell Gore MD at [facility] ENDOSCOPY    HX HEENT      wisdom teeth    MD CHEST SURGERY PROCEDURE UNLISTED      bronchoscopy     No Known Allergies      REVIEW OF SYSTEMS:  General: negative for - chills or fever  ENT: negative for - headaches, nasal congestion or tinnitus  Respiratory: negative for - cough, hemoptysis, shortness of breath or wheezing  Cardiovascular : negative for - chest pain, edema, palpitations or shortness of breath  Gastrointestinal: negative for - abdominal pain, blood in stools, heartburn or nausea/vomiting  Genito-Urinary: no dysuria, trouble voiding, or hematuria  Musculoskeletal: negative for - gait disturbance, joint pain, joint stiffness or joint swelling  Neurological: no TIA or stroke symptoms  Hematologic: no bruises, no bleeding, no swollen glands  Integument: no lumps, mole changes, nail changes or rash  Endocrine: no malaise/lethargy or unexpected weight changes      Social History     Socioeconomic History    Marital status: SINGLE     Spouse name: Not on file    Number of children: Not on file    Years of education: Not on file    Highest education level: Not on file   Tobacco Use    Smoking status: Former Smoker     Packs/day: 0.25     Years: 16.00     Pack years: 4.00     Types: Cigarettes     Start date: 3/6/2000    Smokeless tobacco: Never Used    Tobacco comment: quit 2016   Vaping Use    Vaping Use: Never used   Substance and Sexual Activity    Alcohol use: Yes     Alcohol/week: 0.0 standard drinks     Comment: occasional    Drug use: Yes     Types: Marijuana    Sexual activity: Yes     Partners: Female     Birth control/protection: Condom   Social History Narrative    Habits:  he discontinued cigarette abuse 2016. He has occasional weed. He drinks maybe a glass of wine a week. No substance abuse.         Social History:  The patient is single.   He is the father of a son 16 years old.  He completed his bachelor's degree at [employer]. Did not complete his masters degree, but started working on it. He is self employed. He has no Hinduism preference.         Family History:  Father  39 of unknown cause. He wondered if his dad was poisoned, but he was only [de-identified] years old at the time. Mother is 61. She has had a brain tumor. She also has thyroid disease. Half brother, half sister alive and well. Social Determinants of Health     Financial Resource Strain:     Difficulty of Paying Living Expenses:    Food Insecurity:     Worried About Running Out of Food in the Last Year:     [address] in the Last Year:    Transportation Needs:     Lack of Transportation (Medical):  Lack of Transportation (Non-Medical):    Physical Activity:     Days of Exercise per Week:     Minutes of Exercise per Session:    Stress:     Feeling of Stress :    Social Connections:     Frequency of Communication with Friends and Family:     Frequency of Social Gatherings with Friends and Family:     Attends Baptism Services:     Active Member of Clubs or Organizations:     Attends Club or Organization Meetings:     Marital Status:      Family History   Problem Relation Age of Onset    Arthritis-osteo Mother     Asthma Mother     No Known Problems Father     Migraines Sister     No Known Problems Sister        OBJECTIVE:    Visit Vitals  /72   Pulse 71   Temp 98 °F (36.7 °C) (Oral)   Resp 18   Ht 6' 2\" (1.88 m)   Wt 219 lb 9.6 oz (99.6 kg)   SpO2 97%   BMI 28.19 kg/m²     CONSTITUTIONAL: well , well nourished, appears age appropriate  EYES: perrla, eom intact  ENMT:moist mucous membranes, pharynx clear  NECK: supple.  Thyroid normal  RESPIRATORY: Chest: clear bilaterally   CARDIOVASCULAR: Heart: regular rate and rhythm  GASTROINTESTINAL: Abdomen: soft, bowel sounds active  HEMATOLOGIC: no pathological lymph nodes palpated  MUSCULOSKELETAL: Extremities: no edema, pulse 1+ INTEGUMENT: No unusual rashes or suspicious skin lesions noted. Nails appear normal.  NEUROLOGIC: non-focal exam   MENTAL STATUS: alert and oriented, appropriate affect           ASSESSMENT:  1. ILD (interstitial lung disease) ([facility] [address]    2. Pulmonary sarcoidosis ([facility] [address]    3. Smoker    4. Attention deficit disorder, unspecified hyperactivity presence    5. Attention deficit hyperactivity disorder (ADHD), unspecified ADHD type    6. STD exposure    7. IGT (impaired glucose tolerance)    8. Umbilical hernia without obstruction and without gangrene      Interstitial lung disease is probably sarcoidosis as he describes it and it was quiescent. He is an ex-cigarette smoker, stopped in 2015 or 2016. STD, possible exposure for which we will do appropriate laboratory studies. He has impaired glucose tolerance for which we will check hemoglobin A1c. He has a reducible umbilical hernia, and we gave him appropriate precautions. He will be back to see us in a year or sooner if he has any problems. We encouraged physical activities at least five days a week and a heart-healthy diet. I have discussed the diagnosis with the patient and the intended plan as seen in the  Orders. The patient understands and agees with the plan. The patient has   received an after visit summary and questions were answered concerning  future plans  Patient labs and/or xrays were reviewed  Past records were reviewed. PLAN:  .  Orders Placed This Encounter    URINALYSIS W/ RFLX MICROSCOPIC    CBC WITH AUTOMATED DIFF    METABOLIC PANEL, COMPREHENSIVE    LIPID PANEL    PROSTATE SPECIFIC AG    HEMOGLOBIN A1C WITH EAG    HEPATITIS PANEL, ACUTE    HIV 1/2 AG/AB, 4TH GENERATION,W RFLX CONFIRM    RPR       Follow-up and Dispositions    · Return in about 1 year (around 6/4/2022). ATTENTION:   This medical record was transcribed using an electronic medical records system.   Although proofread, it may and can contain electronic and spelling errors. Other human spelling and other errors may be present. Corrections may be executed at a later time. Please feel free to contact us for any clarifications as needed.

## 2021-06-04 NOTE — PROGRESS NOTES
1. Have you been to the ER, urgent care clinic since your last visit? Hospitalized since your last visit? No    2. Have you seen or consulted any other health care providers outside of the [address] [place] [address] since your last visit? Include any pap smears or colon screening.  No     requesting STD and HIV testing

## 2021-06-07 LAB
ALBUMIN SERPL-MCNC: 4 G/DL (ref 3.5–5)
ALBUMIN/GLOB SERPL: 1.1 {RATIO} (ref 1.1–2.2)
ALP SERPL-CCNC: 73 U/L (ref 45–117)
ALT SERPL-CCNC: 30 U/L (ref 12–78)
ANION GAP SERPL CALC-SCNC: 4 MMOL/L (ref 5–15)
AST SERPL-CCNC: 22 U/L (ref 15–37)
BASOPHILS # BLD: 0.1 K/UL (ref 0–0.1)
BASOPHILS NFR BLD: 2 % (ref 0–1)
BILIRUB SERPL-MCNC: 0.5 MG/DL (ref 0.2–1)
BUN SERPL-MCNC: 12 MG/DL (ref 6–20)
BUN/CREAT SERPL: 10 (ref 12–20)
CALCIUM SERPL-MCNC: 10.2 MG/DL (ref 8.5–10.1)
CHLORIDE SERPL-SCNC: 103 MMOL/L (ref 97–108)
CHOLEST SERPL-MCNC: 220 MG/DL
CO2 SERPL-SCNC: 30 MMOL/L (ref 21–32)
CREAT SERPL-MCNC: 1.15 MG/DL (ref 0.7–1.3)
DIFFERENTIAL METHOD BLD: ABNORMAL
EOSINOPHIL # BLD: 0.5 K/UL (ref 0–0.4)
EOSINOPHIL NFR BLD: 12 % (ref 0–7)
ERYTHROCYTE [DISTWIDTH] IN BLOOD BY AUTOMATED COUNT: 14.9 % (ref 11.5–14.5)
EST. AVERAGE GLUCOSE BLD GHB EST-MCNC: 123 MG/DL
GLOBULIN SER CALC-MCNC: 3.6 G/DL (ref 2–4)
GLUCOSE SERPL-MCNC: 86 MG/DL (ref 65–100)
HAV IGM SER QL: NONREACTIVE
HBA1C MFR BLD: 5.9 % (ref 4–5.6)
HBV CORE IGM SER QL: NONREACTIVE
HBV SURFACE AG SER QL: <0.1 INDEX
HBV SURFACE AG SER QL: NEGATIVE
HCT VFR BLD AUTO: 46.8 % (ref 36.6–50.3)
HCV AB SERPL QL IA: NONREACTIVE
HCV COMMENT,HCGAC: NORMAL
HDLC SERPL-MCNC: 52 MG/DL
HDLC SERPL: 4.2 {RATIO} (ref 0–5)
HGB BLD-MCNC: 14.3 G/DL (ref 12.1–17)
HIV 1+2 AB+HIV1 P24 AG SERPL QL IA: NONREACTIVE
HIV12 RESULT COMMENT, HHIVC: NORMAL
IMM GRANULOCYTES # BLD AUTO: 0 K/UL (ref 0–0.04)
IMM GRANULOCYTES NFR BLD AUTO: 1 % (ref 0–0.5)
LDLC SERPL CALC-MCNC: 143.4 MG/DL (ref 0–100)
LYMPHOCYTES # BLD: 1.4 K/UL (ref 0.8–3.5)
LYMPHOCYTES NFR BLD: 32 % (ref 12–49)
MCH RBC QN AUTO: 24.6 PG (ref 26–34)
MCHC RBC AUTO-ENTMCNC: 30.6 G/DL (ref 30–36.5)
MCV RBC AUTO: 80.4 FL (ref 80–99)
MONOCYTES # BLD: 0.5 K/UL (ref 0–1)
MONOCYTES NFR BLD: 11 % (ref 5–13)
NEUTS SEG # BLD: 1.8 K/UL (ref 1.8–8)
NEUTS SEG NFR BLD: 42 % (ref 32–75)
NRBC # BLD: 0 K/UL (ref 0–0.01)
NRBC BLD-RTO: 0 PER 100 WBC
PLATELET # BLD AUTO: 173 K/UL (ref 150–400)
PMV BLD AUTO: 13 FL (ref 8.9–12.9)
POTASSIUM SERPL-SCNC: 4.6 MMOL/L (ref 3.5–5.1)
PROT SERPL-MCNC: 7.6 G/DL (ref 6.4–8.2)
PSA SERPL-MCNC: 1.3 NG/ML (ref 0.01–4)
RBC # BLD AUTO: 5.82 M/UL (ref 4.1–5.7)
RPR SER QL: NONREACTIVE
SODIUM SERPL-SCNC: 137 MMOL/L (ref 136–145)
SP1: NORMAL
SP2: NORMAL
SP3: NORMAL
TRIGL SERPL-MCNC: 123 MG/DL (ref ?–150)
VLDLC SERPL CALC-MCNC: 24.6 MG/DL
WBC # BLD AUTO: 4.3 K/UL (ref 4.1–11.1)

## 2022-03-18 PROBLEM — F32.9 MAJOR DEPRESSIVE DISORDER: Status: ACTIVE | Noted: 2017-04-11

## 2022-03-19 PROBLEM — M79.605 LEG PAIN, BILATERAL: Status: ACTIVE | Noted: 2021-01-19

## 2022-03-19 PROBLEM — F98.8 ADD (ATTENTION DEFICIT DISORDER): Status: ACTIVE | Noted: 2017-04-11

## 2022-03-19 PROBLEM — E78.00 PURE HYPERCHOLESTEROLEMIA: Status: ACTIVE | Noted: 2017-07-11

## 2022-03-19 PROBLEM — K42.9 UMBILICAL HERNIA WITHOUT OBSTRUCTION AND WITHOUT GANGRENE: Status: ACTIVE | Noted: 2021-06-04

## 2022-03-19 PROBLEM — R73.02 IGT (IMPAIRED GLUCOSE TOLERANCE): Status: ACTIVE | Noted: 2017-07-11

## 2022-03-19 PROBLEM — Z20.2 STD EXPOSURE: Status: ACTIVE | Noted: 2021-06-04

## 2022-03-19 PROBLEM — F52.4 PREMATURE EJACULATION: Status: ACTIVE | Noted: 2021-01-19

## 2022-03-19 PROBLEM — M72.2 PLANTAR FASCIITIS, BILATERAL: Status: ACTIVE | Noted: 2018-11-02

## 2022-03-19 PROBLEM — M79.604 LEG PAIN, BILATERAL: Status: ACTIVE | Noted: 2021-01-19

## 2022-03-20 PROBLEM — E55.9 VITAMIN D DEFICIENCY: Status: ACTIVE | Noted: 2017-07-11

## 2022-06-30 ENCOUNTER — OFFICE VISIT (OUTPATIENT)
Dept: INTERNAL MEDICINE CLINIC | Age: 52
End: 2022-06-30
Payer: MEDICAID

## 2022-06-30 VITALS
SYSTOLIC BLOOD PRESSURE: 120 MMHG | HEIGHT: 74 IN | OXYGEN SATURATION: 98 % | HEART RATE: 60 BPM | BODY MASS INDEX: 28.49 KG/M2 | TEMPERATURE: 97.9 F | RESPIRATION RATE: 18 BRPM | DIASTOLIC BLOOD PRESSURE: 80 MMHG | WEIGHT: 222 LBS

## 2022-06-30 DIAGNOSIS — K42.9 UMBILICAL HERNIA WITHOUT OBSTRUCTION AND WITHOUT GANGRENE: ICD-10-CM

## 2022-06-30 DIAGNOSIS — R73.02 IGT (IMPAIRED GLUCOSE TOLERANCE): ICD-10-CM

## 2022-06-30 DIAGNOSIS — Z87.891 EX-CIGARETTE SMOKER: ICD-10-CM

## 2022-06-30 DIAGNOSIS — F90.2 ATTENTION DEFICIT HYPERACTIVITY DISORDER (ADHD), COMBINED TYPE: ICD-10-CM

## 2022-06-30 DIAGNOSIS — D86.0 PULMONARY SARCOIDOSIS (HCC): ICD-10-CM

## 2022-06-30 DIAGNOSIS — Z00.00 WELL ADULT EXAM: Primary | ICD-10-CM

## 2022-06-30 DIAGNOSIS — E78.00 PURE HYPERCHOLESTEROLEMIA: ICD-10-CM

## 2022-06-30 DIAGNOSIS — J84.9 ILD (INTERSTITIAL LUNG DISEASE) (HCC): ICD-10-CM

## 2022-06-30 DIAGNOSIS — F98.8 ATTENTION DEFICIT DISORDER, UNSPECIFIED HYPERACTIVITY PRESENCE: ICD-10-CM

## 2022-06-30 DIAGNOSIS — L30.9 DERMATITIS: ICD-10-CM

## 2022-06-30 PROCEDURE — 99396 PREV VISIT EST AGE 40-64: CPT | Performed by: INTERNAL MEDICINE

## 2022-06-30 RX ORDER — TRIAMCINOLONE ACETONIDE 1 MG/G
OINTMENT TOPICAL 2 TIMES DAILY
Qty: 80 G | Refills: 11 | Status: SHIPPED | OUTPATIENT
Start: 2022-06-30

## 2022-06-30 RX ORDER — DEXTROAMPHETAMINE SACCHARATE, AMPHETAMINE ASPARTATE, DEXTROAMPHETAMINE SULFATE AND AMPHETAMINE SULFATE 2.5; 2.5; 2.5; 2.5 MG/1; MG/1; MG/1; MG/1
10 TABLET ORAL DAILY
Qty: 30 TABLET | Refills: 0 | Status: SHIPPED | OUTPATIENT
Start: 2022-06-30

## 2022-06-30 RX ORDER — DEXTROAMPHETAMINE SACCHARATE, AMPHETAMINE ASPARTATE, DEXTROAMPHETAMINE SULFATE AND AMPHETAMINE SULFATE 2.5; 2.5; 2.5; 2.5 MG/1; MG/1; MG/1; MG/1
10 TABLET ORAL DAILY
Qty: 30 TABLET | Refills: 0 | Status: CANCELLED | OUTPATIENT
Start: 2022-06-30

## 2022-06-30 RX ORDER — SILDENAFIL 100 MG/1
100 TABLET, FILM COATED ORAL AS NEEDED
Qty: 30 TABLET | Refills: 11 | Status: SHIPPED | OUTPATIENT
Start: 2022-06-30

## 2022-06-30 RX ORDER — SILDENAFIL 100 MG/1
100 TABLET, FILM COATED ORAL AS NEEDED
Qty: 10 TABLET | Refills: 11 | Status: SHIPPED | OUTPATIENT
Start: 2022-06-30 | End: 2022-06-30 | Stop reason: SDUPTHER

## 2022-06-30 NOTE — PROGRESS NOTES
Jabari Ricci is a 49 y.o. male    Chief Complaint   Patient presents with    Complete Physical     1. Have you been to the ER, urgent care clinic since your last visit? Hospitalized since your last visit? No     2. Have you seen or consulted any other health care providers outside of the [address] [place] [address] since your last visit? Include any pap smears or colon screening.   No

## 2022-06-30 NOTE — PROGRESS NOTES
SPORTS MEDICINE AND PRIMARY CARE  Quan Olvera MD, [address] [address]  Phone:  [phone]  Fax: [phone]       Chief Complaint   Patient presents with    Complete Physical   .      SUBJECTIVE:    Trisha Olivo is a 49 y.o. male Patient comes in for a physical examination voicing no specific complaints. He has a history of interstitial lung disease, followed by Colleen Mishra MD, pulmonary sarcoidosis, impaired glucose tolerance, hypercholesterolemia, ADHD, combined type, ex-cigarette smoker, and is seen for evaluation. Current Outpatient Medications   Medication Sig Dispense Refill    dextroamphetamine-amphetamine (ADDERALL) 10 mg tablet Take 1 Tablet by mouth daily. Max Daily Amount: 10 mg. 30 Tablet 0    triamcinolone acetonide (KENALOG) 0.1 % ointment Apply  to affected area two (2) times a day. use thin layer 80 g 11    sildenafil citrate (VIAGRA) 100 mg tablet Take 1 Tablet by mouth as needed for Erectile Dysfunction. 30 Tablet 11    OTHER Herbal total body immunization. Takes 3 capsules po once daily.  Selenium Sulfide 2.25 % topical foam Apply  to affected area every Monday and Thursday. 1 Can 11    albuterol (PROVENTIL HFA, VENTOLIN HFA, PROAIR HFA) 90 mcg/actuation inhaler Take 2 Puffs by inhalation every four (4) hours as needed for Wheezing.  1 Inhaler 6     Past Medical History:   Diagnosis Date    ADHD 05/05/2016    Testing by [address] [place] [address] ADHD combined type moderate    Eczema     Eosinophilia     Ex-cigarette smoker 2016    stopped    Folliculitis     ILD (interstitial lung disease) ([facility]     Enrique Andre md    Leg pain, bilateral 01/19/2021    Plantar fasciitis, bilateral 11/02/2018    Pulmonary lesion     sarcoidosis    S/P colonoscopic polypectomy 07/08/2019    Gabriela Perez MD - repeat 5 yrs    Sarcoidosis 6469    Umbilical hernia without obstruction and without gangrene 06/04/2021 Past Surgical History:   Procedure Laterality Date    COLONOSCOPY N/A 7/8/2019    COL[facility] Valerie [facility]als [facility] at [facility] ENDOSCOPY    HX HEENT      wisdom teeth    ID CHEST SURGERY PROCEDURE UNLISTED      bronchoscopy     No Known Allergies      REVIEW OF SYSTEMS:  General: negative for - chills or fever  ENT: negative for - headaches, nasal congestion or tinnitus  Respiratory: negative for - cough, hemoptysis, shortness of breath or wheezing  Cardiovascular : negative for - chest pain, edema, palpitations or shortness of breath  Gastrointestinal: negative for - abdominal pain, blood in stools, heartburn or nausea/vomiting  Genito-Urinary: no dysuria, trouble voiding, or hematuria  Musculoskeletal: negative for - gait disturbance, joint pain, joint stiffness or joint swelling  Neurological: no TIA or stroke symptoms  Hematologic: no bruises, no bleeding, no swollen glands  Integument: no lumps, mole changes, nail changes or rash  Endocrine: no malaise/lethargy or unexpected weight changes      Social History     Socioeconomic History    Marital status: SINGLE   Tobacco Use    Smoking status: Former Smoker     Packs/day: 0.25     Years: 16.00     Pack years: 4.00     Types: Cigarettes     Start date: 3/6/2000    Smokeless tobacco: Never Used    Tobacco comment: quit 2016   Vaping Use    Vaping Use: Never used   Substance and Sexual Activity    Alcohol use: Yes     Alcohol/week: 0.0 standard drinks     Comment: occasional    Drug use: Yes     Types: Marijuana    Sexual activity: Yes     Partners: Female     Birth control/protection: Condom   Social History Narrative    Habits:  he discontinued cigarette abuse 2016. He has occasional weed. He drinks maybe a glass of wine a week. No substance abuse.         Social History:  The patient is single. He is the father of a son 16years old. He completed his bachelor's degree at [employer].   Did not complete his masters degree, but started working on it. He is self employed   [employer]  He has no Holiness preference.         Family History:  Father  39 of unknown cause. He wondered if his dad was poisoned, but he was only [de-identified] years old at the time. Mother is 61. She has had a brain tumor. She also has thyroid disease. Half brother, half sister alive and well. Family History   Problem Relation Age of Onset    OSTEOARTHRITIS Mother     Asthma Mother     No Known Problems Father     Migraines Sister     No Known Problems Sister        OBJECTIVE:    Visit Vitals  /80 (BP 1 Location: Left upper arm, BP Patient Position: Sitting)   Pulse 60   Temp 97.9 °F (36.6 °C) (Oral)   Resp 18   Ht 6' 2\" (1.88 m)   Wt 222 lb (100.7 kg)   SpO2 98%   BMI 28.50 kg/m²     CONSTITUTIONAL: well , well nourished, appears age appropriate  EYES: perrla, eom intact  ENMT:moist mucous membranes, pharynx clear  NECK: supple. Thyroid normal  RESPIRATORY: Chest: clear bilaterally   CARDIOVASCULAR: Heart: regular rate and rhythm  GASTROINTESTINAL: Abdomen: soft, bowel sounds active  HEMATOLOGIC: no pathological lymph nodes palpated  MUSCULOSKELETAL: Extremities: no edema, pulse 1+   INTEGUMENT: No unusual rashes or suspicious skin lesions noted. Nails appear normal.  NEUROLOGIC: non-focal exam   MENTAL STATUS: alert and oriented, appropriate affect           ASSESSMENT:  1. Well adult exam    2. Attention deficit disorder, unspecified hyperactivity presence    3. ILD (interstitial lung disease) ([address]    4. Umbilical hernia without obstruction and without gangrene    5. IGT (impaired glucose tolerance)    6. Dermatitis    7. Attention deficit hyperactivity disorder (ADHD), combined type    8. Pulmonary sarcoidosis ([address]    9. Pure hypercholesterolemia    10.  Ex-cigarette smoker      This completes an annual exam.  We encourage a heart healthy, weight reducing diet, as well as physical activity for at least 30 minutes five days a week. He is requesting Adderall refill, which we give him a 30 day supply. He agrees to see us every three months for the refills to continue. He has interstitial lung disease due to sarcoidosis, which is followed by [facility]. He has a tender umbilical hernia only on palpation, except for palpation it is not tender. We advise him of the issues that would require a hernia repair. History of impaired glucose tolerance and we will check hemoglobin A1c. He has dermatitis, for which Lotrisone cream seems to be helpful. He will be back to see us in three months or as needed, certainly once a year. I have discussed the diagnosis with the patient and the intended plan as seen in the  Orders. The patient understands and agees with the plan. The patient has   received an after visit summary and questions were answered concerning  future plans  Patient labs and/or xrays were reviewed  Past records were reviewed. PLAN:  .  Orders Placed This Encounter    URINALYSIS W/ RFLX MICROSCOPIC    CBC WITH AUTOMATED DIFF    METABOLIC PANEL, COMPREHENSIVE    LIPID PANEL    PROSTATE SPECIFIC AG    TESTOSTERONE, FREE & TOTAL    PAIN MGMT PANEL, URINE W/RFLX CONFIRM    DISCONTD: sildenafil citrate (VIAGRA) 100 mg tablet    dextroamphetamine-amphetamine (ADDERALL) 10 mg tablet    triamcinolone acetonide (KENALOG) 0.1 % ointment    sildenafil citrate (VIAGRA) 100 mg tablet       Follow-up and Dispositions    · Return in about 1 year (around 6/30/2023). ATTENTION:   This medical record was transcribed using an electronic medical records system. Although proofread, it may and can contain electronic and spelling errors. Other human spelling and other errors may be present. Corrections may be executed at a later time. Please feel free to contact us for any clarifications as needed.

## 2022-07-01 LAB
ALBUMIN SERPL-MCNC: 4.1 G/DL (ref 3.5–5)
ALBUMIN/GLOB SERPL: 1.1 {RATIO} (ref 1.1–2.2)
ALP SERPL-CCNC: 81 U/L (ref 45–117)
ALT SERPL-CCNC: 31 U/L (ref 12–78)
ANION GAP SERPL CALC-SCNC: 5 MMOL/L (ref 5–15)
APPEARANCE UR: CLEAR
AST SERPL-CCNC: 25 U/L (ref 15–37)
BASOPHILS # BLD: 0.1 K/UL (ref 0–0.1)
BASOPHILS NFR BLD: 2 % (ref 0–1)
BILIRUB SERPL-MCNC: 0.5 MG/DL (ref 0.2–1)
BILIRUB UR QL: NEGATIVE
BUN SERPL-MCNC: 10 MG/DL (ref 6–20)
BUN/CREAT SERPL: 9 (ref 12–20)
CALCIUM SERPL-MCNC: 9.9 MG/DL (ref 8.5–10.1)
CHLORIDE SERPL-SCNC: 104 MMOL/L (ref 97–108)
CHOLEST SERPL-MCNC: 214 MG/DL
CO2 SERPL-SCNC: 29 MMOL/L (ref 21–32)
COLOR UR: NORMAL
COMMENT, HOLDF: NORMAL
CREAT SERPL-MCNC: 1.12 MG/DL (ref 0.7–1.3)
DIFFERENTIAL METHOD BLD: ABNORMAL
EOSINOPHIL # BLD: 0.4 K/UL (ref 0–0.4)
EOSINOPHIL NFR BLD: 9 % (ref 0–7)
ERYTHROCYTE [DISTWIDTH] IN BLOOD BY AUTOMATED COUNT: 16.2 % (ref 11.5–14.5)
GLOBULIN SER CALC-MCNC: 3.8 G/DL (ref 2–4)
GLUCOSE SERPL-MCNC: 88 MG/DL (ref 65–100)
GLUCOSE UR STRIP.AUTO-MCNC: NEGATIVE MG/DL
HCT VFR BLD AUTO: 49.1 % (ref 36.6–50.3)
HDLC SERPL-MCNC: 53 MG/DL
HDLC SERPL: 4 {RATIO} (ref 0–5)
HGB BLD-MCNC: 14.9 G/DL (ref 12.1–17)
HGB UR QL STRIP: NEGATIVE
IMM GRANULOCYTES # BLD AUTO: 0 K/UL (ref 0–0.04)
IMM GRANULOCYTES NFR BLD AUTO: 0 % (ref 0–0.5)
KETONES UR QL STRIP.AUTO: NEGATIVE MG/DL
LDLC SERPL CALC-MCNC: 144.6 MG/DL (ref 0–100)
LEUKOCYTE ESTERASE UR QL STRIP.AUTO: NEGATIVE
LYMPHOCYTES # BLD: 1.6 K/UL (ref 0.8–3.5)
LYMPHOCYTES NFR BLD: 37 % (ref 12–49)
MCH RBC QN AUTO: 24.4 PG (ref 26–34)
MCHC RBC AUTO-ENTMCNC: 30.3 G/DL (ref 30–36.5)
MCV RBC AUTO: 80.5 FL (ref 80–99)
MONOCYTES # BLD: 0.4 K/UL (ref 0–1)
MONOCYTES NFR BLD: 9 % (ref 5–13)
NEUTS SEG # BLD: 1.8 K/UL (ref 1.8–8)
NEUTS SEG NFR BLD: 43 % (ref 32–75)
NITRITE UR QL STRIP.AUTO: NEGATIVE
NRBC # BLD: 0 K/UL (ref 0–0.01)
NRBC BLD-RTO: 0 PER 100 WBC
PH UR STRIP: 7 [PH] (ref 5–8)
PLATELET # BLD AUTO: 123 K/UL (ref 150–400)
PLATELET COMMENTS,PCOM: ABNORMAL
PMV BLD AUTO: ABNORMAL FL (ref 8.9–12.9)
POTASSIUM SERPL-SCNC: 4.3 MMOL/L (ref 3.5–5.1)
PROT SERPL-MCNC: 7.9 G/DL (ref 6.4–8.2)
PROT UR STRIP-MCNC: NEGATIVE MG/DL
PSA SERPL-MCNC: 1.1 NG/ML (ref 0.01–4)
RBC # BLD AUTO: 6.1 M/UL (ref 4.1–5.7)
RBC MORPH BLD: ABNORMAL
SAMPLES BEING HELD,HOLD: NORMAL
SODIUM SERPL-SCNC: 138 MMOL/L (ref 136–145)
SP GR UR REFRACTOMETRY: 1.02 (ref 1–1.03)
TRIGL SERPL-MCNC: 82 MG/DL (ref ?–150)
UROBILINOGEN UR QL STRIP.AUTO: 1 EU/DL (ref 0.2–1)
VLDLC SERPL CALC-MCNC: 16.4 MG/DL
WBC # BLD AUTO: 4.3 K/UL (ref 4.1–11.1)

## 2022-07-02 LAB
AMPHETAMINES UR QL SCN: NEGATIVE NG/ML
BARBITURATES UR QL SCN: NEGATIVE NG/ML
BENZODIAZ UR QL SCN: NEGATIVE NG/ML
BZE UR QL SCN: NEGATIVE NG/ML
CANNABINOIDS UR QL SCN: NEGATIVE NG/ML
CREAT UR-MCNC: 200 MG/DL (ref 20–300)
FENTANYL+NORFENTANYL UR QL SCN: NEGATIVE PG/ML
MEPERIDINE UR QL: NEGATIVE NG/ML
METHADONE UR QL SCN: NEGATIVE NG/ML
OPIATES UR QL SCN: NEGATIVE NG/ML
OXYCODONE+OXYMORPHONE UR QL SCN: NEGATIVE NG/ML
PCP UR QL: NEGATIVE NG/ML
PH UR: 6.9 [PH] (ref 4.5–8.9)
PLEASE NOTE:, 733157: NORMAL
PROPOXYPH UR QL SCN: NEGATIVE NG/ML
SP GR UR: 1.02
TRAMADOL UR QL SCN: NEGATIVE NG/ML

## 2022-07-02 NOTE — PROGRESS NOTES
Laboratory studies are acceptable except your cholesterol remains elevated.   Suggest you adhere to a low-cholesterol or Mediterranean diet, your calculated ASCVD risk is 6.1% which puts you at a borderline risk

## 2022-07-03 LAB
TESTOST FREE SERPL-MCNC: 4.2 PG/ML (ref 7.2–24)
TESTOST SERPL-MCNC: 354 NG/DL (ref 264–916)

## 2022-09-29 DIAGNOSIS — J84.9 ILD (INTERSTITIAL LUNG DISEASE) (HCC): Primary | ICD-10-CM

## 2022-09-29 DIAGNOSIS — D86.0 PULMONARY SARCOIDOSIS (HCC): ICD-10-CM

## 2023-05-22 RX ORDER — ALBUTEROL SULFATE 90 UG/1
2 AEROSOL, METERED RESPIRATORY (INHALATION) EVERY 4 HOURS PRN
COMMUNITY
Start: 2017-11-07

## 2023-05-22 RX ORDER — DEXTROAMPHETAMINE SACCHARATE, AMPHETAMINE ASPARTATE, DEXTROAMPHETAMINE SULFATE AND AMPHETAMINE SULFATE 2.5; 2.5; 2.5; 2.5 MG/1; MG/1; MG/1; MG/1
10 TABLET ORAL DAILY
COMMUNITY
Start: 2022-06-30

## 2023-05-22 RX ORDER — SILDENAFIL 100 MG/1
100 TABLET, FILM COATED ORAL PRN
COMMUNITY
Start: 2022-06-30

## 2023-08-23 ENCOUNTER — NURSE TRIAGE (OUTPATIENT)
Dept: OTHER | Facility: CLINIC | Age: 53
End: 2023-08-23
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
## 2024-05-09 ENCOUNTER — OFFICE VISIT (OUTPATIENT)
Facility: CLINIC | Age: 54
End: 2024-05-09

## 2024-05-09 VITALS
HEART RATE: 86 BPM | DIASTOLIC BLOOD PRESSURE: 76 MMHG | WEIGHT: 184.2 LBS | TEMPERATURE: 98 F | RESPIRATION RATE: 18 BRPM | BODY MASS INDEX: 23.64 KG/M2 | HEIGHT: 74 IN | OXYGEN SATURATION: 94 % | SYSTOLIC BLOOD PRESSURE: 108 MMHG

## 2024-05-09 DIAGNOSIS — J84.9 ILD (INTERSTITIAL LUNG DISEASE) (HCC): ICD-10-CM

## 2024-05-09 DIAGNOSIS — E78.00 PURE HYPERCHOLESTEROLEMIA: ICD-10-CM

## 2024-05-09 DIAGNOSIS — R73.02 IGT (IMPAIRED GLUCOSE TOLERANCE): ICD-10-CM

## 2024-05-09 DIAGNOSIS — K42.9 UMBILICAL HERNIA WITHOUT OBSTRUCTION AND WITHOUT GANGRENE: Primary | ICD-10-CM

## 2024-05-09 DIAGNOSIS — D86.0 PULMONARY SARCOIDOSIS (HCC): ICD-10-CM

## 2024-05-09 DIAGNOSIS — R35.1 NOCTURIA: ICD-10-CM

## 2024-05-09 DIAGNOSIS — R91.1 PULMONARY NODULE: ICD-10-CM

## 2024-05-09 DIAGNOSIS — E78.5 DYSLIPIDEMIA: ICD-10-CM

## 2024-05-09 PROCEDURE — 99214 OFFICE O/P EST MOD 30 MIN: CPT | Performed by: INTERNAL MEDICINE

## 2024-05-09 RX ORDER — SILDENAFIL 50 MG/1
50 TABLET, FILM COATED ORAL PRN
Qty: 30 TABLET | Refills: 3 | Status: SHIPPED | OUTPATIENT
Start: 2024-05-09

## 2024-05-09 SDOH — ECONOMIC STABILITY: FOOD INSECURITY: WITHIN THE PAST 12 MONTHS, YOU WORRIED THAT YOUR FOOD WOULD RUN OUT BEFORE YOU GOT MONEY TO BUY MORE.: NEVER TRUE

## 2024-05-09 SDOH — ECONOMIC STABILITY: HOUSING INSECURITY
IN THE LAST 12 MONTHS, WAS THERE A TIME WHEN YOU DID NOT HAVE A STEADY PLACE TO SLEEP OR SLEPT IN A SHELTER (INCLUDING NOW)?: NO

## 2024-05-09 SDOH — ECONOMIC STABILITY: FOOD INSECURITY: WITHIN THE PAST 12 MONTHS, THE FOOD YOU BOUGHT JUST DIDN'T LAST AND YOU DIDN'T HAVE MONEY TO GET MORE.: NEVER TRUE

## 2024-05-09 SDOH — ECONOMIC STABILITY: INCOME INSECURITY: HOW HARD IS IT FOR YOU TO PAY FOR THE VERY BASICS LIKE FOOD, HOUSING, MEDICAL CARE, AND HEATING?: NOT HARD AT ALL

## 2024-05-09 ASSESSMENT — PATIENT HEALTH QUESTIONNAIRE - PHQ9
SUM OF ALL RESPONSES TO PHQ QUESTIONS 1-9: 0
2. FEELING DOWN, DEPRESSED OR HOPELESS: NOT AT ALL
4. FEELING TIRED OR HAVING LITTLE ENERGY: NOT AT ALL
3. TROUBLE FALLING OR STAYING ASLEEP: NOT AT ALL
SUM OF ALL RESPONSES TO PHQ QUESTIONS 1-9: 0
SUM OF ALL RESPONSES TO PHQ9 QUESTIONS 1 & 2: 0
2. FEELING DOWN, DEPRESSED OR HOPELESS: NOT AT ALL
1. LITTLE INTEREST OR PLEASURE IN DOING THINGS: NOT AT ALL
SUM OF ALL RESPONSES TO PHQ QUESTIONS 1-9: 0
SUM OF ALL RESPONSES TO PHQ QUESTIONS 1-9: 0
8. MOVING OR SPEAKING SO SLOWLY THAT OTHER PEOPLE COULD HAVE NOTICED. OR THE OPPOSITE, BEING SO FIGETY OR RESTLESS THAT YOU HAVE BEEN MOVING AROUND A LOT MORE THAN USUAL: NOT AT ALL
SUM OF ALL RESPONSES TO PHQ QUESTIONS 1-9: 0
SUM OF ALL RESPONSES TO PHQ QUESTIONS 1-9: 0
6. FEELING BAD ABOUT YOURSELF - OR THAT YOU ARE A FAILURE OR HAVE LET YOURSELF OR YOUR FAMILY DOWN: NOT AT ALL
7. TROUBLE CONCENTRATING ON THINGS, SUCH AS READING THE NEWSPAPER OR WATCHING TELEVISION: NOT AT ALL
1. LITTLE INTEREST OR PLEASURE IN DOING THINGS: NOT AT ALL
SUM OF ALL RESPONSES TO PHQ QUESTIONS 1-9: 0
9. THOUGHTS THAT YOU WOULD BE BETTER OFF DEAD, OR OF HURTING YOURSELF: NOT AT ALL
5. POOR APPETITE OR OVEREATING: NOT AT ALL
SUM OF ALL RESPONSES TO PHQ QUESTIONS 1-9: 0
SUM OF ALL RESPONSES TO PHQ9 QUESTIONS 1 & 2: 0

## 2024-05-09 NOTE — PROGRESS NOTES
Chief Complaint   Patient presents with    Pre-op Exam     \"Have you been to the ER, urgent care clinic since your last visit?  Hospitalized since your last visit?\"    YES - When: approximately 3  weeks ago.  Where and Why: [facility] for hernia injury.    “Have you seen or consulted any other health care providers outside of [facility] since your last visit?”    NO            Click Here for Release of Records Request  
glands  Integument: no lumps, mole changes, nail changes or rash  Endocrine: no malaise/lethargy or unexpected weight changes      Social History     Socioeconomic History    Marital status: Single     Spouse name: None    Number of children: None    Years of education: None    Highest education level: None   Tobacco Use    Smoking status: Former     Current packs/day: 0.25     Average packs/day: 0.3 packs/day for 24.2 years (6.0 ttl pk-yrs)     Types: Cigarettes     Start date: 3/6/2000    Smokeless tobacco: Never    Tobacco comments:     Quit smoking: quit    Substance and Sexual Activity    Alcohol use: Yes     Comment: occasional    Drug use: Never   Social History Narrative    Habits:  he discontinued cigarette abuse 2016.  He has occasional weed.  He drinks maybe a glass of wine a week.  No substance abuse.     Social History:  The patient is single.  He is the father of a son 17 years old.  He completed his bachelor's degree     at [employer].  Did not complete his masters degree, but started working on it.  He is self employed   [employer]  He has no Mandaen preference.     Family History:  Father  36 of unknown cause.  He wondered if     his dad was poisoned, but he was only [age] years old at the time.  Mother is 63.  She has had a brain tumor.  She also has thyroid disease.  Half brother, half sister alive and well.     Social Determinants of Health     Financial Resource Strain: Low Risk  (2024)    Overall Financial Resource Strain (CARDIA)     Difficulty of Paying Living Expenses: Not hard at all   Food Insecurity: No Food Insecurity (2024)    Hunger Vital Sign     Worried About Running Out of Food in the Last Year: Never true     Ran Out of Food in the Last Year: Never true   Transportation Needs: Unknown (2024)    PRAPARE - Transportation     Lack of Transportation (Non-Medical): No   Housing Stability: Unknown (2024)    Housing Stability

## 2024-05-10 ENCOUNTER — TELEPHONE (OUTPATIENT)
Age: 54
End: 2024-05-10

## 2024-05-10 LAB
25(OH)D3 SERPL-MCNC: 21.8 NG/ML (ref 30–100)
ALBUMIN SERPL-MCNC: 3.7 G/DL (ref 3.5–5)
ALBUMIN/GLOB SERPL: 1 (ref 1.1–2.2)
ALP SERPL-CCNC: 74 U/L (ref 45–117)
ALT SERPL-CCNC: 19 U/L (ref 12–78)
AMORPH CRY URNS QL MICRO: ABNORMAL
ANION GAP SERPL CALC-SCNC: 5 MMOL/L (ref 5–15)
APPEARANCE UR: ABNORMAL
AST SERPL-CCNC: 18 U/L (ref 15–37)
BACTERIA URNS QL MICRO: ABNORMAL /HPF
BASOPHILS # BLD: 0.1 K/UL (ref 0–0.1)
BASOPHILS NFR BLD: 2 % (ref 0–1)
BILIRUB SERPL-MCNC: 0.5 MG/DL (ref 0.2–1)
BILIRUB UR QL: NEGATIVE
BUN SERPL-MCNC: 16 MG/DL (ref 6–20)
BUN/CREAT SERPL: 12 (ref 12–20)
CALCIUM SERPL-MCNC: 10.7 MG/DL (ref 8.5–10.1)
CAOX CRY URNS QL MICRO: ABNORMAL
CHLORIDE SERPL-SCNC: 106 MMOL/L (ref 97–108)
CHOLEST SERPL-MCNC: 181 MG/DL
CO2 SERPL-SCNC: 27 MMOL/L (ref 21–32)
COLOR UR: ABNORMAL
CREAT SERPL-MCNC: 1.31 MG/DL (ref 0.7–1.3)
DIFFERENTIAL METHOD BLD: ABNORMAL
EOSINOPHIL # BLD: 0.2 K/UL (ref 0–0.4)
EOSINOPHIL NFR BLD: 5 % (ref 0–7)
EPITH CASTS URNS QL MICRO: ABNORMAL /LPF
ERYTHROCYTE [DISTWIDTH] IN BLOOD BY AUTOMATED COUNT: 15.1 % (ref 11.5–14.5)
EST. AVERAGE GLUCOSE BLD GHB EST-MCNC: 123 MG/DL
GLOBULIN SER CALC-MCNC: 3.7 G/DL (ref 2–4)
GLUCOSE SERPL-MCNC: 100 MG/DL (ref 65–100)
GLUCOSE UR STRIP.AUTO-MCNC: NEGATIVE MG/DL
HBA1C MFR BLD: 5.9 % (ref 4–5.6)
HCT VFR BLD AUTO: 44.9 % (ref 36.6–50.3)
HDLC SERPL-MCNC: 48 MG/DL
HDLC SERPL: 3.8 (ref 0–5)
HGB BLD-MCNC: 13.9 G/DL (ref 12.1–17)
HGB UR QL STRIP: NEGATIVE
IMM GRANULOCYTES # BLD AUTO: 0 K/UL (ref 0–0.04)
IMM GRANULOCYTES NFR BLD AUTO: 1 % (ref 0–0.5)
KETONES UR QL STRIP.AUTO: ABNORMAL MG/DL
LDLC SERPL CALC-MCNC: 110.2 MG/DL (ref 0–100)
LEUKOCYTE ESTERASE UR QL STRIP.AUTO: NEGATIVE
LYMPHOCYTES # BLD: 1.5 K/UL (ref 0.8–3.5)
LYMPHOCYTES NFR BLD: 32 % (ref 12–49)
MCH RBC QN AUTO: 23.9 PG (ref 26–34)
MCHC RBC AUTO-ENTMCNC: 31 G/DL (ref 30–36.5)
MCV RBC AUTO: 77.1 FL (ref 80–99)
MONOCYTES # BLD: 0.6 K/UL (ref 0–1)
MONOCYTES NFR BLD: 12 % (ref 5–13)
NEUTS SEG # BLD: 2.4 K/UL (ref 1.8–8)
NEUTS SEG NFR BLD: 48 % (ref 32–75)
NITRITE UR QL STRIP.AUTO: NEGATIVE
NRBC # BLD: 0 K/UL (ref 0–0.01)
NRBC BLD-RTO: 0 PER 100 WBC
PH UR STRIP: 5.5 (ref 5–8)
PLATELET # BLD AUTO: 180 K/UL (ref 150–400)
POTASSIUM SERPL-SCNC: 4.2 MMOL/L (ref 3.5–5.1)
PROT SERPL-MCNC: 7.4 G/DL (ref 6.4–8.2)
PROT UR STRIP-MCNC: NEGATIVE MG/DL
PSA SERPL-MCNC: 1.6 NG/ML (ref 0.01–4)
RBC # BLD AUTO: 5.82 M/UL (ref 4.1–5.7)
RBC #/AREA URNS HPF: ABNORMAL /HPF (ref 0–5)
RBC MORPH BLD: ABNORMAL
SODIUM SERPL-SCNC: 138 MMOL/L (ref 136–145)
SP GR UR REFRACTOMETRY: 1.03 (ref 1–1.03)
TRIGL SERPL-MCNC: 114 MG/DL
UROBILINOGEN UR QL STRIP.AUTO: 1 EU/DL (ref 0.2–1)
VLDLC SERPL CALC-MCNC: 22.8 MG/DL
WBC # BLD AUTO: 4.8 K/UL (ref 4.1–11.1)
WBC URNS QL MICRO: ABNORMAL /HPF (ref 0–4)
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
## 2024-05-29 ENCOUNTER — HOSPITAL ENCOUNTER (OUTPATIENT)
Facility: HOSPITAL | Age: 54
Discharge: HOME | End: 2024-06-01
Attending: INTERNAL MEDICINE
Payer: MEDICAID

## 2024-05-29 DIAGNOSIS — R91.1 PULMONARY NODULE: ICD-10-CM

## 2024-05-29 DIAGNOSIS — J84.9 ILD (INTERSTITIAL LUNG DISEASE) (HCC): Primary | ICD-10-CM

## 2024-05-29 DIAGNOSIS — D86.9 SARCOIDOSIS: ICD-10-CM

## 2024-05-29 DIAGNOSIS — D86.0 PULMONARY SARCOIDOSIS (HCC): ICD-10-CM

## 2024-05-29 PROCEDURE — 71250 CT THORAX DX C-: CPT

## 2024-06-17 ENCOUNTER — OFFICE VISIT (OUTPATIENT)
Age: 54
End: 2024-06-17
Payer: MEDICAID

## 2024-06-17 VITALS
WEIGHT: 189 LBS | TEMPERATURE: 98.1 F | BODY MASS INDEX: 24.26 KG/M2 | RESPIRATION RATE: 16 BRPM | HEIGHT: 74 IN | SYSTOLIC BLOOD PRESSURE: 110 MMHG | DIASTOLIC BLOOD PRESSURE: 75 MMHG | HEART RATE: 102 BPM | OXYGEN SATURATION: 94 %

## 2024-06-17 DIAGNOSIS — K42.9 UMBILICAL HERNIA WITHOUT OBSTRUCTION AND WITHOUT GANGRENE: Primary | ICD-10-CM

## 2024-06-17 PROCEDURE — 99203 OFFICE O/P NEW LOW 30 MIN: CPT | Performed by: SURGERY

## 2024-06-17 ASSESSMENT — ANXIETY QUESTIONNAIRES
7. FEELING AFRAID AS IF SOMETHING AWFUL MIGHT HAPPEN: NOT AT ALL
5. BEING SO RESTLESS THAT IT IS HARD TO SIT STILL: NOT AT ALL
IF YOU CHECKED OFF ANY PROBLEMS ON THIS QUESTIONNAIRE, HOW DIFFICULT HAVE THESE PROBLEMS MADE IT FOR YOU TO DO YOUR WORK, TAKE CARE OF THINGS AT HOME, OR GET ALONG WITH OTHER PEOPLE: NOT DIFFICULT AT ALL
4. TROUBLE RELAXING: NOT AT ALL
2. NOT BEING ABLE TO STOP OR CONTROL WORRYING: NOT AT ALL
1. FEELING NERVOUS, ANXIOUS, OR ON EDGE: NOT AT ALL
GAD7 TOTAL SCORE: 0
3. WORRYING TOO MUCH ABOUT DIFFERENT THINGS: NOT AT ALL
6. BECOMING EASILY ANNOYED OR IRRITABLE: NOT AT ALL

## 2024-06-17 ASSESSMENT — PATIENT HEALTH QUESTIONNAIRE - PHQ9
1. LITTLE INTEREST OR PLEASURE IN DOING THINGS: NOT AT ALL
SUM OF ALL RESPONSES TO PHQ QUESTIONS 1-9: 0
SUM OF ALL RESPONSES TO PHQ QUESTIONS 1-9: 0
SUM OF ALL RESPONSES TO PHQ9 QUESTIONS 1 & 2: 0
2. FEELING DOWN, DEPRESSED OR HOPELESS: NOT AT ALL
SUM OF ALL RESPONSES TO PHQ QUESTIONS 1-9: 0
SUM OF ALL RESPONSES TO PHQ QUESTIONS 1-9: 0

## 2024-06-17 NOTE — PROGRESS NOTES
Identified patient with two patient identifiers (name and ). Reviewed chart in preparation for visit and have obtained necessary documentation.    Arron Sanchez is a 53 y.o. male  Chief Complaint   Patient presents with    New Patient     Umbilical hernia      /75 (Site: Left Upper Arm, Position: Sitting, Cuff Size: Small Adult)   Pulse (!) 102   Temp 98.1 °F (36.7 °C) (Oral)   Resp 16   Ht 1.88 m (6' 2\")   Wt 85.7 kg (189 lb)   SpO2 94%   BMI 24.27 kg/m²     1. Have you been to the ER, urgent care clinic since your last visit?  Hospitalized since your last visit?no    2. Have you seen or consulted any other health care providers outside of the [facility] since your last visit?  Include any pap smears or colon screening. no

## 2024-06-17 NOTE — PROGRESS NOTES
[facility] History and Physical    History of Present Illness:      Arron Sanchez is a 53 y.o. male who has an umbilical hernia.  He has had an umbilical hernia for few years.  It is getting a little bit bigger and causing him more pain.  The pain is a 3 out of 10 sometimes when he pushes on it.  He would like to consider surgical repair.  He has had no changes in bowel movements.  He is on prednisone for interstitial lung disease.  He is able to workout and exercise.    Past Medical History:   Diagnosis Date    ADHD 05/05/2016    Testing by [facility] [place] ADHD combined type moderate    Eczema     Eosinophilia     Ex-cigarette smoker 2016    stopped    Folliculitis     ILD (interstitial lung disease) ([facility]     Joshua Turner md    Leg pain, bilateral 01/19/2021    Low testosterone in male     Plantar fasciitis, bilateral 11/02/2018    Pulmonary lesion     sarcoidosis    S/P colonoscopic polypectomy 07/08/2019    Guido Coyne MD - repeat 5 yrs    Sarcoidosis 2013    Umbilical hernia without obstruction and without gangrene 06/04/2021       Past Surgical History:   Procedure Laterality Date    CHEST SURGERY      bronchoscopy    COLONOSCOPY N/A 7/8/2019    COLONOSCOPY performed by Guido Coyne MD at [facility] ENDOSCOPY    HEENT      wisdom teeth         Current Outpatient Medications:     PREDNISONE PO, Take 40 mg by mouth daily, Disp: , Rfl:     sildenafil (VIAGRA) 50 MG tablet, Take 1 tablet by mouth as needed for Erectile Dysfunction (Patient not taking: Reported on 6/17/2024), Disp: 30 tablet, Rfl: 3    No Known Allergies    Social History     Socioeconomic History    Marital status: Single     Spouse name: Not on file    Number of children: Not on file    Years of education: Not on file    Highest education level: Not on file   Occupational History    Not on file   Tobacco Use    Smoking status: Former     Current packs/day:

## 2024-06-18 ENCOUNTER — TELEPHONE (OUTPATIENT)
Age: 54
End: 2024-06-18

## 2024-06-18 ENCOUNTER — PREP FOR PROCEDURE (OUTPATIENT)
Age: 54
End: 2024-06-18

## 2024-06-18 PROBLEM — K42.9 UMBILICAL HERNIA: Status: ACTIVE | Noted: 2024-06-18

## 2024-06-18 NOTE — TELEPHONE ENCOUNTER
Pt called back to schedule surgery with Dr. Winkler. Wanted to do the surgery around the week of July 13th but informed him that Dr. Winkler would be out. Offered the date of 07/16 and patient accepted.

## 2024-06-18 NOTE — TELEPHONE ENCOUNTER
Contacted patient to schedule surgery with Dr. Winkler. Stated he will give me a call back after talking to his mother.

## 2024-07-09 ENCOUNTER — HOSPITAL ENCOUNTER (OUTPATIENT)
Facility: HOSPITAL | Age: 54
Discharge: HOME | End: 2024-07-12
Payer: MEDICAID

## 2024-07-09 ENCOUNTER — PATIENT MESSAGE (OUTPATIENT)
Age: 54
End: 2024-07-09

## 2024-07-09 VITALS
TEMPERATURE: 97.8 F | SYSTOLIC BLOOD PRESSURE: 120 MMHG | BODY MASS INDEX: 24.38 KG/M2 | HEIGHT: 74 IN | WEIGHT: 190 LBS | HEART RATE: 76 BPM | DIASTOLIC BLOOD PRESSURE: 81 MMHG

## 2024-07-09 LAB
BASOPHILS # BLD: 0.1 K/UL (ref 0–0.1)
BASOPHILS NFR BLD: 1 % (ref 0–1)
DIFFERENTIAL METHOD BLD: ABNORMAL
EOSINOPHIL # BLD: 0.1 K/UL (ref 0–0.4)
EOSINOPHIL NFR BLD: 2 % (ref 0–7)
ERYTHROCYTE [DISTWIDTH] IN BLOOD BY AUTOMATED COUNT: 16.3 % (ref 11.5–14.5)
HCT VFR BLD AUTO: 44.1 % (ref 36.6–50.3)
HGB BLD-MCNC: 13.7 G/DL (ref 12.1–17)
IMM GRANULOCYTES # BLD AUTO: 0 K/UL (ref 0–0.04)
IMM GRANULOCYTES NFR BLD AUTO: 0 % (ref 0–0.5)
LYMPHOCYTES # BLD: 1.9 K/UL (ref 0.8–3.5)
LYMPHOCYTES NFR BLD: 25 % (ref 12–49)
MCH RBC QN AUTO: 24 PG (ref 26–34)
MCHC RBC AUTO-ENTMCNC: 31.1 G/DL (ref 30–36.5)
MCV RBC AUTO: 77.4 FL (ref 80–99)
MONOCYTES # BLD: 0.9 K/UL (ref 0–1)
MONOCYTES NFR BLD: 12 % (ref 5–13)
NEUTS SEG # BLD: 4.4 K/UL (ref 1.8–8)
NEUTS SEG NFR BLD: 60 % (ref 32–75)
NRBC # BLD: 0 K/UL (ref 0–0.01)
NRBC BLD-RTO: 0 PER 100 WBC
PLATELET # BLD AUTO: 173 K/UL (ref 150–400)
RBC # BLD AUTO: 5.7 M/UL (ref 4.1–5.7)
WBC # BLD AUTO: 7.4 K/UL (ref 4.1–11.1)

## 2024-07-09 PROCEDURE — 85025 COMPLETE CBC W/AUTO DIFF WBC: CPT

## 2024-07-09 RX ORDER — BUPROPION HYDROCHLORIDE 150 MG/1
150 TABLET ORAL EVERY MORNING
COMMUNITY

## 2024-07-09 RX ORDER — CIDER VINEGAR 300 MG
3000 TABLET ORAL DAILY
COMMUNITY

## 2024-07-09 NOTE — PERIOP NOTE
PATIENT GIVEN SURGICAL SITE INFECTION FAQ HANDOUT AND HAND WASHING TIP SHEET. PREOP INSTRUCTIONS REVIEWED AND PATIENT VERBALIZES UNDERSTANDING OF INSTRUCTIONS. PATIENT HAS BEEN GIVEN THE OPPORTUNITY TO ASK ADDITIONAL QUESTIONS.    CALLED DR. BELLA MCCAIN ([facility]) FOR LAST OV NOTE SPOKE WITH MILLY . [phone]

## 2024-07-09 NOTE — PERIOP NOTE
[address] [address]   MAIN OR                                  [phone]    MAIN PRE OP             [phone]                                                                                AMBULATORY PRE OP          [phone]  PRE-ADMISSION TESTING    [phone]     Surgery Date:  7/16/24       Is surgery arrival time given by surgeon?  NO     If “NO”, [facility] staff will call you between 4 and 7pm the day before your surgery with your arrival time. (If your surgery is on a Monday, we will call you the Friday before.)    Call [phone] after 7pm Monday-Friday if you did not receive this call.    INSTRUCTIONS BEFORE YOUR SURGERY   When You  Arrive Arrive at [facility] Patient Access on 1st floor the day of your surgery.  Have your insurance card, photo ID,living will/advanced directive/POA (if applicable),  and any copayment (if needed)   Food   and   Drink NO solid food after midnight the night before surgery. You can drink clear liquids from midnight until ONE hour prior to your arrival at the hospital on the day of your surgery. Clear liquids include:  Water  Fruit juices without pulp  Carbonated beverages  Black coffee(no cream/milk)  Tea(no cream/milk)  Gatorade    No alcohol (beer, wine, liquor) or marijuana (smoking) 24 hours, edibles (3 days). Stop smoking cigarettes 14 days before surgery (helps w/healing and breathing).   Medications to   TAKE   Morning of Surgery MEDICATIONS TO TAKE THE MORNING OF SURGERY WITH A SIP OF WATER:   WELLBUTRIN (BUPROPION), USE PULMICORT INHALER IF NEEDED MORNING OF SURGERY AND BRING TO HOSPITAL DAY OF SURGERY.   You may take these medications, IF NEEDED, the morning of surgery: TYLENOL  Ask your surgeon/prescribing doctor for instructions on taking or stopping these medications prior to surgery: PREDNISONE   Medications to STOP  before surgery Non-Steroidal anti-inflammatory Drugs (NSAID's):

## 2024-07-16 ENCOUNTER — ANESTHESIA EVENT (OUTPATIENT)
Facility: HOSPITAL | Age: 54
End: 2024-07-16
Payer: MEDICAID

## 2024-07-16 ENCOUNTER — ANESTHESIA (OUTPATIENT)
Facility: HOSPITAL | Age: 54
End: 2024-07-16
Payer: MEDICAID

## 2024-07-16 ENCOUNTER — HOSPITAL ENCOUNTER (OUTPATIENT)
Facility: HOSPITAL | Age: 54
Setting detail: OUTPATIENT SURGERY
Discharge: HOME | End: 2024-07-16
Attending: SURGERY | Admitting: SURGERY
Payer: MEDICAID

## 2024-07-16 VITALS
DIASTOLIC BLOOD PRESSURE: 84 MMHG | OXYGEN SATURATION: 95 % | WEIGHT: 190 LBS | SYSTOLIC BLOOD PRESSURE: 115 MMHG | TEMPERATURE: 97 F | BODY MASS INDEX: 24.39 KG/M2 | HEART RATE: 64 BPM | RESPIRATION RATE: 14 BRPM

## 2024-07-16 DIAGNOSIS — K42.9 UMBILICAL HERNIA WITHOUT OBSTRUCTION AND WITHOUT GANGRENE: Primary | ICD-10-CM

## 2024-07-16 PROCEDURE — 2500000003 HC RX 250 WO HCPCS: Performed by: NURSE ANESTHETIST, CERTIFIED REGISTERED

## 2024-07-16 PROCEDURE — 2580000003 HC RX 258: Performed by: NURSE ANESTHETIST, CERTIFIED REGISTERED

## 2024-07-16 PROCEDURE — 6360000002 HC RX W HCPCS: Performed by: NURSE ANESTHETIST, CERTIFIED REGISTERED

## 2024-07-16 PROCEDURE — 2580000003 HC RX 258: Performed by: SURGERY

## 2024-07-16 PROCEDURE — 2709999900 HC NON-CHARGEABLE SUPPLY: Performed by: SURGERY

## 2024-07-16 PROCEDURE — 7100000000 HC PACU RECOVERY - FIRST 15 MIN: Performed by: SURGERY

## 2024-07-16 PROCEDURE — 3600000012 HC SURGERY LEVEL 2 ADDTL 15MIN: Performed by: SURGERY

## 2024-07-16 PROCEDURE — 6360000002 HC RX W HCPCS: Performed by: SURGERY

## 2024-07-16 PROCEDURE — 49591 RPR AA HRN 1ST < 3 CM RDC: CPT | Performed by: SURGERY

## 2024-07-16 PROCEDURE — 3700000000 HC ANESTHESIA ATTENDED CARE: Performed by: SURGERY

## 2024-07-16 PROCEDURE — 7100000011 HC PHASE II RECOVERY - ADDTL 15 MIN: Performed by: SURGERY

## 2024-07-16 PROCEDURE — 3600000002 HC SURGERY LEVEL 2 BASE: Performed by: SURGERY

## 2024-07-16 PROCEDURE — 6370000000 HC RX 637 (ALT 250 FOR IP): Performed by: SURGERY

## 2024-07-16 PROCEDURE — 7100000001 HC PACU RECOVERY - ADDTL 15 MIN: Performed by: SURGERY

## 2024-07-16 PROCEDURE — 7100000010 HC PHASE II RECOVERY - FIRST 15 MIN: Performed by: SURGERY

## 2024-07-16 PROCEDURE — 3700000001 HC ADD 15 MINUTES (ANESTHESIA): Performed by: SURGERY

## 2024-07-16 PROCEDURE — 49591 RPR AA HRN 1ST < 3 CM RDC: CPT | Performed by: PHYSICIAN ASSISTANT

## 2024-07-16 RX ORDER — SODIUM CHLORIDE, SODIUM LACTATE, POTASSIUM CHLORIDE, CALCIUM CHLORIDE 600; 310; 30; 20 MG/100ML; MG/100ML; MG/100ML; MG/100ML
INJECTION, SOLUTION INTRAVENOUS CONTINUOUS PRN
Status: DISCONTINUED | OUTPATIENT
Start: 2024-07-16 | End: 2024-07-16 | Stop reason: SDUPTHER

## 2024-07-16 RX ORDER — DEXAMETHASONE SODIUM PHOSPHATE 4 MG/ML
INJECTION, SOLUTION INTRA-ARTICULAR; INTRALESIONAL; INTRAMUSCULAR; INTRAVENOUS; SOFT TISSUE PRN
Status: DISCONTINUED | OUTPATIENT
Start: 2024-07-16 | End: 2024-07-16 | Stop reason: SDUPTHER

## 2024-07-16 RX ORDER — PHENYLEPHRINE HCL IN 0.9% NACL 0.4MG/10ML
SYRINGE (ML) INTRAVENOUS PRN
Status: DISCONTINUED | OUTPATIENT
Start: 2024-07-16 | End: 2024-07-16 | Stop reason: SDUPTHER

## 2024-07-16 RX ORDER — HYDROMORPHONE HYDROCHLORIDE 2 MG/ML
INJECTION, SOLUTION INTRAMUSCULAR; INTRAVENOUS; SUBCUTANEOUS PRN
Status: DISCONTINUED | OUTPATIENT
Start: 2024-07-16 | End: 2024-07-16 | Stop reason: SDUPTHER

## 2024-07-16 RX ORDER — ONDANSETRON 2 MG/ML
INJECTION INTRAMUSCULAR; INTRAVENOUS PRN
Status: DISCONTINUED | OUTPATIENT
Start: 2024-07-16 | End: 2024-07-16 | Stop reason: SDUPTHER

## 2024-07-16 RX ORDER — NEOSTIGMINE METHYLSULFATE 1 MG/ML
INJECTION, SOLUTION INTRAVENOUS PRN
Status: DISCONTINUED | OUTPATIENT
Start: 2024-07-16 | End: 2024-07-16 | Stop reason: SDUPTHER

## 2024-07-16 RX ORDER — SODIUM CHLORIDE 0.9 % (FLUSH) 0.9 %
5-40 SYRINGE (ML) INJECTION EVERY 12 HOURS SCHEDULED
Status: DISCONTINUED | OUTPATIENT
Start: 2024-07-16 | End: 2024-07-16 | Stop reason: HOSPADM

## 2024-07-16 RX ORDER — MIDAZOLAM HYDROCHLORIDE 1 MG/ML
INJECTION INTRAMUSCULAR; INTRAVENOUS PRN
Status: DISCONTINUED | OUTPATIENT
Start: 2024-07-16 | End: 2024-07-16 | Stop reason: SDUPTHER

## 2024-07-16 RX ORDER — SODIUM CHLORIDE 0.9 % (FLUSH) 0.9 %
5-40 SYRINGE (ML) INJECTION PRN
Status: DISCONTINUED | OUTPATIENT
Start: 2024-07-16 | End: 2024-07-16 | Stop reason: HOSPADM

## 2024-07-16 RX ORDER — FENTANYL CITRATE 50 UG/ML
INJECTION, SOLUTION INTRAMUSCULAR; INTRAVENOUS PRN
Status: DISCONTINUED | OUTPATIENT
Start: 2024-07-16 | End: 2024-07-16 | Stop reason: SDUPTHER

## 2024-07-16 RX ORDER — SODIUM CHLORIDE 9 MG/ML
INJECTION, SOLUTION INTRAVENOUS PRN
Status: DISCONTINUED | OUTPATIENT
Start: 2024-07-16 | End: 2024-07-16 | Stop reason: HOSPADM

## 2024-07-16 RX ORDER — ACETAMINOPHEN 500 MG
1000 TABLET ORAL ONCE
Status: COMPLETED | OUTPATIENT
Start: 2024-07-16 | End: 2024-07-16

## 2024-07-16 RX ORDER — LIDOCAINE HYDROCHLORIDE 20 MG/ML
INJECTION, SOLUTION EPIDURAL; INFILTRATION; INTRACAUDAL; PERINEURAL PRN
Status: DISCONTINUED | OUTPATIENT
Start: 2024-07-16 | End: 2024-07-16 | Stop reason: SDUPTHER

## 2024-07-16 RX ORDER — BUPIVACAINE HYDROCHLORIDE 5 MG/ML
INJECTION, SOLUTION EPIDURAL; INTRACAUDAL PRN
Status: DISCONTINUED | OUTPATIENT
Start: 2024-07-16 | End: 2024-07-16 | Stop reason: HOSPADM

## 2024-07-16 RX ORDER — ROCURONIUM BROMIDE 10 MG/ML
INJECTION, SOLUTION INTRAVENOUS PRN
Status: DISCONTINUED | OUTPATIENT
Start: 2024-07-16 | End: 2024-07-16 | Stop reason: SDUPTHER

## 2024-07-16 RX ORDER — IBUPROFEN 600 MG/1
600 TABLET ORAL 3 TIMES DAILY PRN
Qty: 30 TABLET | Refills: 0 | Status: SHIPPED | OUTPATIENT
Start: 2024-07-16

## 2024-07-16 RX ORDER — OXYCODONE HYDROCHLORIDE AND ACETAMINOPHEN 5; 325 MG/1; MG/1
1 TABLET ORAL EVERY 6 HOURS PRN
Qty: 20 TABLET | Refills: 0 | Status: SHIPPED | OUTPATIENT
Start: 2024-07-16 | End: 2024-07-23

## 2024-07-16 RX ORDER — GLYCOPYRROLATE 0.2 MG/ML
INJECTION, SOLUTION INTRAMUSCULAR; INTRAVENOUS PRN
Status: DISCONTINUED | OUTPATIENT
Start: 2024-07-16 | End: 2024-07-16 | Stop reason: SDUPTHER

## 2024-07-16 RX ADMIN — GLYCOPYRROLATE 0.2 MG: 0.2 INJECTION, SOLUTION INTRAMUSCULAR; INTRAVENOUS at 12:43

## 2024-07-16 RX ADMIN — ROCURONIUM BROMIDE 40 MG: 10 SOLUTION INTRAVENOUS at 12:15

## 2024-07-16 RX ADMIN — Medication 40 MCG: at 12:46

## 2024-07-16 RX ADMIN — Medication 1 MG: at 12:47

## 2024-07-16 RX ADMIN — WATER 2000 MG: 1 INJECTION INTRAMUSCULAR; INTRAVENOUS; SUBCUTANEOUS at 12:25

## 2024-07-16 RX ADMIN — PROPOFOL 150 MG: 10 INJECTION, EMULSION INTRAVENOUS at 12:15

## 2024-07-16 RX ADMIN — PROPOFOL 30 MG: 10 INJECTION, EMULSION INTRAVENOUS at 12:53

## 2024-07-16 RX ADMIN — LIDOCAINE HYDROCHLORIDE 75 MG: 20 INJECTION, SOLUTION EPIDURAL; INFILTRATION; INTRACAUDAL; PERINEURAL at 12:15

## 2024-07-16 RX ADMIN — Medication 40 MCG: at 12:55

## 2024-07-16 RX ADMIN — Medication 1 MG: at 12:45

## 2024-07-16 RX ADMIN — DEXAMETHASONE SODIUM PHOSPHATE 4 MG: 4 INJECTION, SOLUTION INTRAMUSCULAR; INTRAVENOUS at 12:23

## 2024-07-16 RX ADMIN — Medication 40 MCG: at 12:15

## 2024-07-16 RX ADMIN — MIDAZOLAM 2 MG: 1 INJECTION INTRAMUSCULAR; INTRAVENOUS at 12:05

## 2024-07-16 RX ADMIN — ONDANSETRON 4 MG: 2 INJECTION INTRAMUSCULAR; INTRAVENOUS at 12:23

## 2024-07-16 RX ADMIN — GLYCOPYRROLATE 0.2 MG: 0.2 INJECTION, SOLUTION INTRAMUSCULAR; INTRAVENOUS at 12:45

## 2024-07-16 RX ADMIN — ACETAMINOPHEN 1000 MG: 500 TABLET ORAL at 10:15

## 2024-07-16 RX ADMIN — GLYCOPYRROLATE 0.2 MG: 0.2 INJECTION, SOLUTION INTRAMUSCULAR; INTRAVENOUS at 12:47

## 2024-07-16 RX ADMIN — HYDROMORPHONE HYDROCHLORIDE 0.4 MG: 2 INJECTION, SOLUTION INTRAMUSCULAR; INTRAVENOUS; SUBCUTANEOUS at 12:50

## 2024-07-16 RX ADMIN — FENTANYL CITRATE 100 MCG: 50 INJECTION, SOLUTION INTRAMUSCULAR; INTRAVENOUS at 12:15

## 2024-07-16 RX ADMIN — SODIUM CHLORIDE, POTASSIUM CHLORIDE, SODIUM LACTATE AND CALCIUM CHLORIDE: 600; 310; 30; 20 INJECTION, SOLUTION INTRAVENOUS at 11:58

## 2024-07-16 RX ADMIN — Medication 1 MG: at 12:43

## 2024-07-16 RX ADMIN — PROPOFOL 20 MG: 10 INJECTION, EMULSION INTRAVENOUS at 12:51

## 2024-07-16 ASSESSMENT — PAIN - FUNCTIONAL ASSESSMENT: PAIN_FUNCTIONAL_ASSESSMENT: NONE - DENIES PAIN

## 2024-07-16 ASSESSMENT — ENCOUNTER SYMPTOMS: SHORTNESS OF BREATH: 1

## 2024-07-16 NOTE — DISCHARGE INSTRUCTIONS
prevent pneumonia.  If you had laparoscopic surgery, you may also have pain in your left shoulder. The pain usually lasts about a day or two.  Follow-up care is a key part of your treatment and safety. Be sure to make and go to all appointments, and call your doctor if you are having problems. It's also a good idea to know your test results and keep a list of the medicines you take.  When should you call for help?  Call 911 anytime you think you may need emergency care. For example, call if:  You passed out (lost consciousness).  You have sudden chest pain and shortness of breath, or you cough up blood.  You have severe pain in your belly.  Call your doctor now or seek immediate medical care if:  You are sick to your stomach and cannot keep fluids down.  You have signs of a blood clot, such as:  Pain in your calf, back of the knee, thigh, or groin.  Redness and swelling in your leg or groin.  You have signs of infection, such as:  Increased pain, swelling, warmth, or redness.  Red streaks leading from the incision.  Pus draining from the incision.  Swollen lymph nodes in the groin.  A fever.  You have trouble passing urine or stool, especially if you have mild pain or swelling in your lower belly.  Bright red blood has soaked through the bandage over your incision.  Watch closely for changes in your health, and be sure to contact your doctor if:  Your swelling is getting worse.  Your swelling is not going down.  You still don't have a bowel movement after taking a laxative.   Where can you learn more?   Go to http://www.IPDIA.net/[facility]  Enter B577 in the search box to learn more about \"Abdominal Hernia Repair: What to Expect at Home.\"   © 1794-8719 [employer], [employer]. Care instructions adapted under license by [employer] (which disclaims liability or warranty for this information). This care instruction is for use with your licensed healthcare professional. If you have questions about a medical

## 2024-07-16 NOTE — ANESTHESIA POSTPROCEDURE EVALUATION
Department of Anesthesiology  Postprocedure Note    Patient: Arron Barnett  MRN: [id number]  YOB: 1970  Date of evaluation: 7/16/2024    Procedure Summary       Date: 07/16/24 Room / Location: [facility] MAIN OR [address] [place] [address] MAIN OR    Anesthesia Start: 1204 Anesthesia Stop: 1319    Procedure: OPEN UMBILICAL HERNIA REPAIR (Abdomen) Diagnosis:       Umbilical hernia      (Umbilical hernia [K42.9])    Providers: Raj Winkler MD Responsible Provider: Betzy Escalona MD    Anesthesia Type: General ASA Status: 2            Anesthesia Type: General    Alex Phase I: Alex Score: 10    Alex Phase II:      Anesthesia Post Evaluation    Patient location during evaluation: PACU  Level of consciousness: awake  Airway patency: patent  Nausea & Vomiting: no nausea  Cardiovascular status: hemodynamically stable  Respiratory status: acceptable  Hydration status: stable  Comments: --------------------            07/16/24               1415     --------------------   BP:       115/84     Pulse:      64       Resp:       14       Temp:                SpO2:      95%      --------------------   Pain management: adequate    No notable events documented.

## 2024-07-16 NOTE — ANESTHESIA PRE PROCEDURE
Department of Anesthesiology  Preprocedure Note       Name:  Arron Barnett   Age:  53 y.o.  :  1970                                          MRN:  [id number]         Date:  2024      Surgeon: Surgeon(s):  Raj Winkler MD    Procedure: Procedure(s):  OPEN UMBILICAL HERNIA REPAIR    Medications prior to admission:   Prior to Admission medications    Medication Sig Start Date End Date Taking? Authorizing Provider   buPROPion (WELLBUTRIN XL) 150 MG extended release tablet Take 1 tablet by mouth every morning    Tessie Brian MD   budesonide (PULMICORT FLEXHALER) 180 MCG/ACT AEPB inhaler Inhale 2 puffs into the lungs as needed (SARCOIDOSIS)    Tessie Brian MD   NONFORMULARY MULTIVITAMIN GNC 1 TAB DAILY    Tessie Brian MD   NONFORMULARY ASHWAGANDA 2 TABS DAILY PO - PATIENT DOESN'T KNOW DOSAGE    Tessie Brian MD   Omega-3 Fatty Acids (FISH OIL) 1000 MG CPDR Take 3 capsules by mouth daily    Tessie Brian MD   PREDNISONE PO Take 20 mg by mouth Daily with lunch PATIENT TAPERING OFF. PULMONARY DOCTOR PRESCRIBED. PATIENT HAS SARCOIDOSIS. NEXT WEEK PATIENT WILL BE TAKING 10MG EVERY DAY.    ProviderTessie MD   sildenafil (VIAGRA) 50 MG tablet Take 1 tablet by mouth as needed for Erectile Dysfunction  Patient not taking: Reported on 2024   Edis Morales MD       Current medications:    Current Facility-Administered Medications   Medication Dose Route Frequency Provider Last Rate Last Admin   • sodium chloride flush 0.9 % injection 5-40 mL  5-40 mL IntraVENous 2 times per day Raj Winkler MD       • sodium chloride flush 0.9 % injection 5-40 mL  5-40 mL IntraVENous PRN Raj Winkler MD       • 0.9 % sodium chloride infusion   IntraVENous PRN Raj Winkler MD       • ceFAZolin (ANCEF) 2,000 mg in sterile water 20 mL IV syringe  2,000 mg IntraVENous On Call to OR Raj Winkler MD           Allergies:  No Known Allergies    Problem List:

## 2024-07-16 NOTE — H&P
AST 18 05/09/2024     ALT 19 05/09/2024     LABGLOM 65 05/09/2024     GFRAA >60 06/30/2022     AGRATIO 1.1 06/30/2022     GLOB 3.7 05/09/2024               Lab Results   Component Value Date     WBC 4.8 05/09/2024     HGB 13.9 05/09/2024     HCT 44.9 05/09/2024     MCV 77.1 (L) 05/09/2024      05/09/2024            Imaging  None  I have reviewed and agree with all of the pertinent images     Assessment:      Arron Sanchez is a 53 y.o. male with umbilical hernia     Recommendations:      1.   The patient does have a small soft reducible umbilical hernia that is likely a centimeter or less in size.  I will schedule him for open primary repair of the umbilical hernia.  Mesh will likely not be needed since it is small.  He is on prednisone 40 mg a day which I have asked him to decrease some if possible which she has done in the past temporarily for surgery and then can potentially increase it after surgery.  I have discussed the above procedure with the patient in detail.  We reviewed the benefits and possible complications of the surgery which include bleeding, infection, damage to adjacent organs, venous thromboembolism, need for repeat surgery, death and other unforseen complications.  The patient agreed to proceed with the surgery.          Raj Winkler MD

## 2024-07-16 NOTE — BRIEF OP NOTE
Brief Postoperative Note      Patient: Arron Barnett  YOB: 1970  MRN: [id number]    Date of Procedure: 7/16/2024    Pre-Op Diagnosis Codes:     * Umbilical hernia [K42.9]    Post-Op Diagnosis: Same       Procedure(s):  OPEN UMBILICAL HERNIA REPAIR    Surgeon(s):  Raj Winkler MD    Assistant:  Physician Assistant: Cira Palma PA    Anesthesia: General    Estimated Blood Loss (mL): Minimal    Complications: None    Specimens:   * No specimens in log *    Implants:  * No implants in log *      Drains: * No LDAs found *    Findings:  Infection Present At Time Of Surgery (PATOS) (choose all levels that have infection present):  No infection present  Other Findings: 6mm umbilical hernia repaired primarily    Electronically signed by Raj Winkler MD on 7/16/2024 at 1:01 PM

## 2024-07-17 NOTE — OP NOTE
[address]  [address]                            OPERATIVE REPORT      PATIENT NAME: DORINDA VAZQUEZ            : 1970  MED REC NO: [id number]                       ROOM: OR  ACCOUNT NO: [id number]                       ADMIT DATE: 2024  PROVIDER: Raj Winkler MD    DATE OF SERVICE:  2024    PREOPERATIVE DIAGNOSES:  Umbilical hernia.    POSTOPERATIVE DIAGNOSES:  Umbilical hernia.    PROCEDURES PERFORMED:  Open umbilical hernia repair.    SURGEON:  Raj Winkler MD    ASSISTANT:  Physician assistant, Cira Palma.    ANESTHESIA:  General.    ESTIMATED BLOOD LOSS:  Minimal.    SPECIMENS REMOVED:  None.    INTRAOPERATIVE FINDINGS:  Umbilical hernia about 6 mm in size, repaired primarily.     COMPLICATIONS:  None.    IMPLANTS:  None.    INDICATIONS:  The patient is a 53-year-old male, who has a history of umbilical hernia that is needing repair in the operating room.  My PA, Cira Palma was necessary for the operation due to the complex nature of the operation and there were no other skilled assistants available for this operation.    DESCRIPTION OF PROCEDURE:  The patient was met in the preop holding area.  H and P was updated.  Consent was signed.  All risks and benefits were explained to the patient prior to the start of the operation.  He was taken back to the operating room.  He was lying in a supine position.  The abdomen was prepped and draped in standard sterile fashion.  Time-out was called.  Antibiotics were given.  SCDs on lower extremities.  We started the operation by making an infraumbilical incision just underneath the umbilicus in a curvilinear fashion.  We then dissected through the subcutaneous tissue with Bovie cautery and then dissected down to the umbilical stalk.  We then dissected around the umbilical stalk with a Litzy clamp, dissecting 360 degrees around the base of the umbilicus.  I then incised

## 2024-07-22 ENCOUNTER — TELEPHONE (OUTPATIENT)
Age: 54
End: 2024-07-22

## 2024-07-22 NOTE — TELEPHONE ENCOUNTER
Patient stated he has a bandage on and was not sure if he was supposed to take it off himself or wait until his follow up appointment.

## 2024-07-23 NOTE — TELEPHONE ENCOUNTER
Returned call to Mr Barnett verified patient with 2 patient identifiers.     Reviewed notes patient is s/p open umbilical hernia repair surgery on 7/16/24.     I advised patient the dressing was to be removed in 5 days the derrma bond will fall off.

## 2024-07-31 ENCOUNTER — OFFICE VISIT (OUTPATIENT)
Age: 54
End: 2024-07-31
Payer: MEDICAID

## 2024-07-31 VITALS
TEMPERATURE: 97.5 F | SYSTOLIC BLOOD PRESSURE: 114 MMHG | HEIGHT: 74 IN | BODY MASS INDEX: 24.97 KG/M2 | OXYGEN SATURATION: 95 % | RESPIRATION RATE: 20 BRPM | DIASTOLIC BLOOD PRESSURE: 74 MMHG | WEIGHT: 194.6 LBS | HEART RATE: 79 BPM

## 2024-07-31 DIAGNOSIS — Z09 POSTOPERATIVE EXAMINATION: Primary | ICD-10-CM

## 2024-07-31 DIAGNOSIS — Z09 S/P UMBILICAL HERNIA REPAIR, FOLLOW-UP EXAM: ICD-10-CM

## 2024-07-31 PROCEDURE — 99212 OFFICE O/P EST SF 10 MIN: CPT

## 2024-07-31 ASSESSMENT — PATIENT HEALTH QUESTIONNAIRE - PHQ9
1. LITTLE INTEREST OR PLEASURE IN DOING THINGS: NOT AT ALL
4. FEELING TIRED OR HAVING LITTLE ENERGY: NOT AT ALL
9. THOUGHTS THAT YOU WOULD BE BETTER OFF DEAD, OR OF HURTING YOURSELF: NOT AT ALL
8. MOVING OR SPEAKING SO SLOWLY THAT OTHER PEOPLE COULD HAVE NOTICED. OR THE OPPOSITE, BEING SO FIGETY OR RESTLESS THAT YOU HAVE BEEN MOVING AROUND A LOT MORE THAN USUAL: NOT AT ALL
SUM OF ALL RESPONSES TO PHQ9 QUESTIONS 1 & 2: 0
SUM OF ALL RESPONSES TO PHQ QUESTIONS 1-9: 0
3. TROUBLE FALLING OR STAYING ASLEEP: NOT AT ALL
10. IF YOU CHECKED OFF ANY PROBLEMS, HOW DIFFICULT HAVE THESE PROBLEMS MADE IT FOR YOU TO DO YOUR WORK, TAKE CARE OF THINGS AT HOME, OR GET ALONG WITH OTHER PEOPLE: NOT DIFFICULT AT ALL
7. TROUBLE CONCENTRATING ON THINGS, SUCH AS READING THE NEWSPAPER OR WATCHING TELEVISION: NOT AT ALL
5. POOR APPETITE OR OVEREATING: NOT AT ALL
6. FEELING BAD ABOUT YOURSELF - OR THAT YOU ARE A FAILURE OR HAVE LET YOURSELF OR YOUR FAMILY DOWN: NOT AT ALL
2. FEELING DOWN, DEPRESSED OR HOPELESS: NOT AT ALL
SUM OF ALL RESPONSES TO PHQ QUESTIONS 1-9: 0

## 2024-07-31 NOTE — PROGRESS NOTES
CC: Post Operative state    Subjective:     Arron Barnett is a 53 y.o. male presents for postop care 2 weeks s/p Open umbilical hernia repair.     Patient states he believes he is doing well postoperatively. Denies any pain or issues with surgical site.   Tolerating a regular diet; No nausea or vomiting.   Bowel movements are regular.  Voiding without difficulty.  Denies fever, chest pain, or shortness of breath.     Review of Systems:  A comprehensive review of systems was negative except for that written above      Mr. Barnett has a reminder for a \"due or due soon\" health maintenance. I have asked that he contact his primary care provider for follow-up on this health maintenance.      Objective:     /74 (Site: Left Upper Arm, Position: Sitting, Cuff Size: Large Adult)   Pulse 79   Temp 97.5 °F (36.4 °C) (Oral)   Resp 20   Ht 1.88 m (6' 2\")   Wt 88.3 kg (194 lb 9.6 oz)   SpO2 95%   BMI 24.99 kg/m²     General: alert, appears stated age, cooperative, and no distress  CV: Regular rate and rhythm  Pulmonary: Lungs clear to auscultation; unlabored  Abdomen:soft, bowel sounds active, non-tender  Umbilical Incision:  healing well, no drainage, no erythema, no swelling, no dehiscence, incision well approximated. No signs of infection     Assessment:      Diagnosis Orders   1. Postoperative examination        2. S/P umbilical hernia repair, follow-up exam             53 year old male 2 weeks s/p Open umbilical hernia repair doing well postop.     Plan:     Reassured pt that surgical sites are healing well postop.   Wound care discussed. May submerge in water and continue to wash with mild soap and water. May moisturize surgical sites as desired.   No lifting greater than 15 lbs x 4 weeks then may advance activity as tolerated.  May use heating pad for any abd soreness/pain.   Arron Barnett verbalized understanding and questions were answered to the best of my knowledge and ability.   Return

## 2024-07-31 NOTE — PROGRESS NOTES
Identified pt with two pt identifiers (name and ). Reviewed chart in preparation for visit and have obtained necessary documentation.    Arron Barnett is a 53 y.o. male  Chief Complaint   Patient presents with    Post-Op Check     S/p OPEN UMBILICAL HERNIA REPAIR     /74 (Site: Left Upper Arm, Position: Sitting, Cuff Size: Large Adult)   Pulse 79   Temp 97.5 °F (36.4 °C) (Oral)   Resp 20   Ht 1.88 m (6' 2\")   Wt 88.3 kg (194 lb 9.6 oz)   SpO2 95%   BMI 24.99 kg/m²     1. Have you been to the ER, urgent care clinic since your last visit?  Hospitalized since your last visit?no    2. Have you seen or consulted any other health care providers outside of the [facility] since your last visit?  Include any pap smears or colon screening. no

## 2025-06-03 ENCOUNTER — OFFICE VISIT (OUTPATIENT)
Facility: CLINIC | Age: 55
End: 2025-06-03
Payer: MEDICAID

## 2025-06-03 ENCOUNTER — RESULTS FOLLOW-UP (OUTPATIENT)
Facility: CLINIC | Age: 55
End: 2025-06-03

## 2025-06-03 VITALS
RESPIRATION RATE: 16 BRPM | HEART RATE: 92 BPM | SYSTOLIC BLOOD PRESSURE: 124 MMHG | DIASTOLIC BLOOD PRESSURE: 83 MMHG | WEIGHT: 226 LBS | TEMPERATURE: 97.7 F | OXYGEN SATURATION: 95 % | HEIGHT: 74 IN | BODY MASS INDEX: 29 KG/M2

## 2025-06-03 DIAGNOSIS — D86.9 SARCOIDOSIS: ICD-10-CM

## 2025-06-03 DIAGNOSIS — J84.9 ILD (INTERSTITIAL LUNG DISEASE) (HCC): Primary | ICD-10-CM

## 2025-06-03 DIAGNOSIS — R73.02 IGT (IMPAIRED GLUCOSE TOLERANCE): ICD-10-CM

## 2025-06-03 DIAGNOSIS — E78.5 DYSLIPIDEMIA: ICD-10-CM

## 2025-06-03 DIAGNOSIS — R91.1 PULMONARY NODULE: ICD-10-CM

## 2025-06-03 DIAGNOSIS — R35.1 NOCTURIA: ICD-10-CM

## 2025-06-03 DIAGNOSIS — Z12.11 SCREEN FOR COLON CANCER: ICD-10-CM

## 2025-06-03 PROBLEM — Z98.890 H/O UMBILICAL HERNIA REPAIR: Status: ACTIVE | Noted: 2024-07-16

## 2025-06-03 PROBLEM — Z87.19 H/O UMBILICAL HERNIA REPAIR: Status: ACTIVE | Noted: 2024-07-16

## 2025-06-03 LAB
ALBUMIN SERPL-MCNC: 4 G/DL (ref 3.5–5)
ALBUMIN/GLOB SERPL: 1.1 (ref 1.1–2.2)
ALP SERPL-CCNC: 91 U/L (ref 45–117)
ALT SERPL-CCNC: 30 U/L (ref 12–78)
ANION GAP SERPL CALC-SCNC: 7 MMOL/L (ref 2–12)
APPEARANCE UR: ABNORMAL
AST SERPL-CCNC: 16 U/L (ref 15–37)
BACTERIA URNS QL MICRO: NEGATIVE /HPF
BASOPHILS # BLD: 0.11 K/UL (ref 0–0.1)
BASOPHILS NFR BLD: 1.3 % (ref 0–1)
BILIRUB SERPL-MCNC: 0.3 MG/DL (ref 0.2–1)
BILIRUB UR QL: NEGATIVE
BUN SERPL-MCNC: 14 MG/DL (ref 6–20)
BUN/CREAT SERPL: 11 (ref 12–20)
CALCIUM SERPL-MCNC: 10.6 MG/DL (ref 8.5–10.1)
CHLORIDE SERPL-SCNC: 104 MMOL/L (ref 97–108)
CHOLEST SERPL-MCNC: 214 MG/DL
CO2 SERPL-SCNC: 29 MMOL/L (ref 21–32)
COLOR UR: ABNORMAL
CREAT SERPL-MCNC: 1.29 MG/DL (ref 0.7–1.3)
DIFFERENTIAL METHOD BLD: ABNORMAL
EOSINOPHIL # BLD: 0.33 K/UL (ref 0–0.4)
EOSINOPHIL NFR BLD: 4 % (ref 0–7)
EPITH CASTS URNS QL MICRO: ABNORMAL /LPF
ERYTHROCYTE [DISTWIDTH] IN BLOOD BY AUTOMATED COUNT: 14.5 % (ref 11.5–14.5)
EST. AVERAGE GLUCOSE BLD GHB EST-MCNC: 126 MG/DL
GLOBULIN SER CALC-MCNC: 3.5 G/DL (ref 2–4)
GLUCOSE SERPL-MCNC: 84 MG/DL (ref 65–100)
GLUCOSE UR STRIP.AUTO-MCNC: NEGATIVE MG/DL
HBA1C MFR BLD: 6 % (ref 4–5.6)
HCT VFR BLD AUTO: 49.4 % (ref 36.6–50.3)
HDLC SERPL-MCNC: 59 MG/DL
HDLC SERPL: 3.6 (ref 0–5)
HGB BLD-MCNC: 14.9 G/DL (ref 12.1–17)
HGB UR QL STRIP: NEGATIVE
HYALINE CASTS URNS QL MICRO: ABNORMAL /LPF (ref 0–5)
IMM GRANULOCYTES # BLD AUTO: 0.03 K/UL (ref 0–0.04)
IMM GRANULOCYTES NFR BLD AUTO: 0.4 % (ref 0–0.5)
KETONES UR QL STRIP.AUTO: ABNORMAL MG/DL
LDLC SERPL CALC-MCNC: 117.8 MG/DL (ref 0–100)
LEUKOCYTE ESTERASE UR QL STRIP.AUTO: NEGATIVE
LYMPHOCYTES # BLD: 2.2 K/UL (ref 0.8–3.5)
LYMPHOCYTES NFR BLD: 26.8 % (ref 12–49)
MCH RBC QN AUTO: 24.8 PG (ref 26–34)
MCHC RBC AUTO-ENTMCNC: 30.2 G/DL (ref 30–36.5)
MCV RBC AUTO: 82.1 FL (ref 80–99)
MONOCYTES # BLD: 1.08 K/UL (ref 0–1)
MONOCYTES NFR BLD: 13.2 % (ref 5–13)
NEUTS SEG # BLD: 4.46 K/UL (ref 1.8–8)
NEUTS SEG NFR BLD: 54.3 % (ref 32–75)
NITRITE UR QL STRIP.AUTO: NEGATIVE
NRBC # BLD: 0 K/UL (ref 0–0.01)
NRBC BLD-RTO: 0 PER 100 WBC
PH UR STRIP: 5 (ref 5–8)
PLATELET # BLD AUTO: 180 K/UL (ref 150–400)
POTASSIUM SERPL-SCNC: 4 MMOL/L (ref 3.5–5.1)
PROT SERPL-MCNC: 7.5 G/DL (ref 6.4–8.2)
PROT UR STRIP-MCNC: NEGATIVE MG/DL
PSA SERPL-MCNC: 1.8 NG/ML (ref 0.01–4)
RBC # BLD AUTO: 6.02 M/UL (ref 4.1–5.7)
RBC #/AREA URNS HPF: ABNORMAL /HPF (ref 0–5)
SODIUM SERPL-SCNC: 140 MMOL/L (ref 136–145)
SP GR UR REFRACTOMETRY: >1.03 (ref 1–1.03)
TRIGL SERPL-MCNC: 186 MG/DL
UROBILINOGEN UR QL STRIP.AUTO: 0.2 EU/DL (ref 0.2–1)
VLDLC SERPL CALC-MCNC: 37.2 MG/DL
WBC # BLD AUTO: 8.2 K/UL (ref 4.1–11.1)
WBC URNS QL MICRO: ABNORMAL /HPF (ref 0–4)

## 2025-06-03 PROCEDURE — 99214 OFFICE O/P EST MOD 30 MIN: CPT | Performed by: INTERNAL MEDICINE

## 2025-06-03 RX ORDER — TRIAMCINOLONE ACETONIDE 1 MG/G
OINTMENT TOPICAL 2 TIMES DAILY
Qty: 80 G | Refills: 11 | Status: SHIPPED | OUTPATIENT
Start: 2025-06-03 | End: 2025-06-10

## 2025-06-03 RX ORDER — SILDENAFIL 100 MG/1
100 TABLET, FILM COATED ORAL PRN
Qty: 30 TABLET | Refills: 3 | Status: SHIPPED | OUTPATIENT
Start: 2025-06-03

## 2025-06-03 SDOH — ECONOMIC STABILITY: FOOD INSECURITY: WITHIN THE PAST 12 MONTHS, THE FOOD YOU BOUGHT JUST DIDN'T LAST AND YOU DIDN'T HAVE MONEY TO GET MORE.: NEVER TRUE

## 2025-06-03 SDOH — ECONOMIC STABILITY: FOOD INSECURITY: WITHIN THE PAST 12 MONTHS, YOU WORRIED THAT YOUR FOOD WOULD RUN OUT BEFORE YOU GOT MONEY TO BUY MORE.: NEVER TRUE

## 2025-06-03 ASSESSMENT — PATIENT HEALTH QUESTIONNAIRE - PHQ9
8. MOVING OR SPEAKING SO SLOWLY THAT OTHER PEOPLE COULD HAVE NOTICED. OR THE OPPOSITE, BEING SO FIGETY OR RESTLESS THAT YOU HAVE BEEN MOVING AROUND A LOT MORE THAN USUAL: NOT AT ALL
9. THOUGHTS THAT YOU WOULD BE BETTER OFF DEAD, OR OF HURTING YOURSELF: NOT AT ALL
SUM OF ALL RESPONSES TO PHQ QUESTIONS 1-9: 0
SUM OF ALL RESPONSES TO PHQ QUESTIONS 1-9: 0
7. TROUBLE CONCENTRATING ON THINGS, SUCH AS READING THE NEWSPAPER OR WATCHING TELEVISION: NOT AT ALL
SUM OF ALL RESPONSES TO PHQ QUESTIONS 1-9: 0
6. FEELING BAD ABOUT YOURSELF - OR THAT YOU ARE A FAILURE OR HAVE LET YOURSELF OR YOUR FAMILY DOWN: NOT AT ALL
10. IF YOU CHECKED OFF ANY PROBLEMS, HOW DIFFICULT HAVE THESE PROBLEMS MADE IT FOR YOU TO DO YOUR WORK, TAKE CARE OF THINGS AT HOME, OR GET ALONG WITH OTHER PEOPLE: NOT DIFFICULT AT ALL
SUM OF ALL RESPONSES TO PHQ QUESTIONS 1-9: 0
3. TROUBLE FALLING OR STAYING ASLEEP: NOT AT ALL
1. LITTLE INTEREST OR PLEASURE IN DOING THINGS: NOT AT ALL
2. FEELING DOWN, DEPRESSED OR HOPELESS: NOT AT ALL
4. FEELING TIRED OR HAVING LITTLE ENERGY: NOT AT ALL
5. POOR APPETITE OR OVEREATING: NOT AT ALL

## 2025-06-03 NOTE — PROGRESS NOTES
SPORTS MEDICINE AND PRIMARY CARE  Edis Morales MD, FACP, CMD  [address] [facility] [address]  Phone:  [phone]  Fax: [phone]       Chief Complaint   Patient presents with    Annual Exam   .      SUBJECTIVE:       History of Present Illness  The patient is a 54-year-old black male who presents for a yearly checkup and evaluation of interstitial lung disease, pulmonary nodule, sarcoidosis, impaired glucose tolerance, and history of cigarette use.    He underwent an open umbilical hernia repair on 07/16/2024 by Dr. Raj Winkler, with an uneventful recovery. He is currently under the care of [facility], although he is uncertain of his current pulmonologist's name due to the recent [facility] of his previous doctor, Dr. Hoffmann. He anticipates a follow-up appointment with his pulmonologist within the next 1 to 2 months. He acknowledges the need to resume his exercise regimen.    A CT scan performed in 2024 showed resolution of adenopathy but persistent severe lung disease. He reports no return to cigarette use for the past 10 years and has also ceased marijuana use.    He has a history of impaired glucose tolerance and dyslipidemia, managed by diet alone. He is also seeking a refill of his triamcinolone ointment for eczema management.    PAST SURGICAL HISTORY:  - Open umbilical hernia repair on 07/16/2024    SOCIAL HISTORY  The patient stopped cigarette smoking about 10 years ago and has not gone back. He has also stopped marijuana use, so there is no history of substance abuse.    Current Outpatient Medications   Medication Sig Dispense Refill    ibuprofen (ADVIL;MOTRIN) 600 MG tablet Take 1 tablet by mouth 3 times daily as needed for Pain 30 tablet 0    buPROPion (WELLBUTRIN XL) 150 MG extended release tablet Take 1 tablet by mouth every morning      budesonide (PULMICORT FLEXHALER) 180 MCG/ACT AEPB inhaler Inhale 2 puffs into the lungs as needed (SARCOIDOSIS)      NONFORMULARY

## 2025-06-03 NOTE — PROGRESS NOTES
Chief Complaint   Patient presents with    Annual Exam     Have you been to the ER, urgent care clinic since your last visit?  Hospitalized since your last visit?   NO    Have you seen or consulted any other health care providers outside our system since your last visit?   NO      Have you had a colorectal cancer screening such as a colonoscopy/FIT/Cologuard?    NO    Date of last Colonoscopy: 7/8/2019  No cologuard on file  No FIT/FOBT on file   No flexible sigmoidoscopy on file

## 2025-06-05 DIAGNOSIS — R79.89 LOW TESTOSTERONE IN MALE: Primary | ICD-10-CM

## 2025-06-05 LAB
TESTOST FREE SERPL-MCNC: 1.8 PG/ML (ref 7.2–24)
TESTOST SERPL-MCNC: 143 NG/DL (ref 264–916)

## (undated) DEVICE — CONNECTOR TBNG AUX H2O JET DISP FOR OLY 160/180 SER

## (undated) DEVICE — SYRINGE MED 10ML LUERLOCK TIP W/O SFTY DISP

## (undated) DEVICE — CONTAINER SPEC 20 ML LID NEUT BUFF FORMALIN 10 % POLYPR STS

## (undated) DEVICE — NEEDLE HYPO 18GA L1.5IN PNK S STL HUB POLYPR SHLD REG BVL

## (undated) DEVICE — NEONATAL-ADULT SPO2 SENSOR: Brand: NELLCOR

## (undated) DEVICE — 1200 GUARD II KIT W/5MM TUBE W/O VAC TUBE: Brand: GUARDIAN

## (undated) DEVICE — PENCIL SMK EVAC 10 FT BLADE ELECTRD ROCKER FOR TELSCP

## (undated) DEVICE — APPLICATOR MEDICATED 26 CC SOLUTION HI LT ORNG CHLORAPREP

## (undated) DEVICE — LIQUIBAND RAPID ADHESIVE 36/CS 0.8ML: Brand: MEDLINE

## (undated) DEVICE — PACK,LAPAROTOMY,2 REINFORCED GOWNS: Brand: MEDLINE

## (undated) DEVICE — AIRLIFE™ U/CONNECT-IT OXYGEN TUBING 7 FEET (2.1 M) CRUSH-RESISTANT OXYGEN TUBING, VINYL TIPPED: Brand: AIRLIFE™

## (undated) DEVICE — REM POLYHESIVE ADULT PATIENT RETURN ELECTRODE: Brand: VALLEYLAB

## (undated) DEVICE — GLOVE SURG SZ 8 L12IN FNGR THK79MIL GRN LTX FREE

## (undated) DEVICE — FCPS BX HOT RJ4 2.2MMX240CM -- RADIAL JAW 4 BX/40

## (undated) DEVICE — SOLIDIFIER FLUID 3000 CC ABSORB

## (undated) DEVICE — ELECTRODE PT RET AD L9FT HI MOIST COND ADH HYDRGEL CORDED

## (undated) DEVICE — TRAP SURG QUAD PARABOLA SLOT DSGN SFTY SCRN TRAPEASE

## (undated) DEVICE — BAG BELONG PT PERS CLEAR HANDL

## (undated) DEVICE — SUTURE MONOCRYL + SZ 4-0 L27IN ABSRB UD L19MM PS-2 3/8 CIR MCP426H

## (undated) DEVICE — Z DISCONTINUED USE 2751540 TUBING IRRIG L10IN DISP PMP ENDOGATOR

## (undated) DEVICE — GARMENT,MEDLINE,DVT,INT,CALF,MED, GEN2: Brand: MEDLINE

## (undated) DEVICE — SUTURE ETHIBOND EXCEL SZ 0 L18IN NONABSORBABLE GRN L36MM CT-1 CX21D

## (undated) DEVICE — SYRINGE MED 20ML STD CLR PLAS LUERLOCK TIP N CTRL DISP

## (undated) DEVICE — CATH IV AUTOGRD BC BLU 22GA 25 -- INSYTE

## (undated) DEVICE — Device: Brand: MEDICAL ACTION INDUSTRIES

## (undated) DEVICE — UNDERPAD XTR STRGHT 30X36IN --

## (undated) DEVICE — BASIN ST MAJOR-NO CAUTERY: Brand: MEDLINE INDUSTRIES, INC.

## (undated) DEVICE — SUTURE VICRYL + SZ 2-0 L27IN ABSRB WHT SH 1/2 CIR TAPERCUT VCP417H

## (undated) DEVICE — Z DISCONTINUED NO SUB IDED SET EXTN W/ 4 W STPCOCK M SPIN LOK 36IN

## (undated) DEVICE — X-RAY DETECTABLE SPONGES,16 PLY: Brand: VISTEC

## (undated) DEVICE — GLOVE ORANGE PI 7 1/2   MSG9075

## (undated) DEVICE — BW-412T DISP COMBO CLEANING BRUSH: Brand: SINGLE USE COMBINATION CLEANING BRUSH

## (undated) DEVICE — SUTURE VICRYL + SZ 3-0 L27IN ABSRB UD L26MM SH 1/2 CIR VCP416H

## (undated) DEVICE — BLADE CLIPPER GEN PURP NS

## (undated) DEVICE — ENDO CARRY-ON PROCEDURE KIT INCLUDES ENZYMATIC SPONGE, GAUZE, BIOHAZARD LABEL, TRAY, LUBRICANT, DIRTY SCOPE LABEL, WATER LABEL, TRAY, DRAWSTRING PAD, AND DEFENDO 4-PIECE KIT.: Brand: ENDO CARRY-ON PROCEDURE KIT

## (undated) DEVICE — QUILTED PREMIUM COMFORT UNDERPAD,EXTRA HEAVY: Brand: WINGS

## (undated) DEVICE — KENDALL RADIOLUCENT FOAM MONITORING ELECTRODE -RECTANGULAR SHAPE: Brand: KENDALL

## (undated) DEVICE — BAG SPEC BIOHZD LF 2MIL 6X10IN -- CONVERT TO ITEM 357326

## (undated) DEVICE — Device

## (undated) DEVICE — SET ADMIN 16ML TBNG L100IN 2 Y INJ SITE IV PIGGY BK DISP

## (undated) DEVICE — HYPODERMIC SAFETY NEEDLE: Brand: MONOJECT